# Patient Record
Sex: MALE | Race: WHITE | NOT HISPANIC OR LATINO | Employment: OTHER | URBAN - METROPOLITAN AREA
[De-identification: names, ages, dates, MRNs, and addresses within clinical notes are randomized per-mention and may not be internally consistent; named-entity substitution may affect disease eponyms.]

---

## 2017-03-03 ENCOUNTER — OFFICE VISIT - RIVER FALLS (OUTPATIENT)
Dept: FAMILY MEDICINE | Facility: CLINIC | Age: 67
End: 2017-03-03

## 2017-03-03 ASSESSMENT — MIFFLIN-ST. JEOR: SCORE: 1722.19

## 2017-05-30 ENCOUNTER — COMMUNICATION - RIVER FALLS (OUTPATIENT)
Dept: FAMILY MEDICINE | Facility: CLINIC | Age: 67
End: 2017-05-30

## 2017-05-30 ENCOUNTER — AMBULATORY - RIVER FALLS (OUTPATIENT)
Dept: FAMILY MEDICINE | Facility: CLINIC | Age: 67
End: 2017-05-30

## 2017-05-31 LAB
CHOLEST SERPL-MCNC: 114 MG/DL (ref 125–200)
CHOLEST/HDLC SERPL: 3.2 {RATIO}
CREAT SERPL-MCNC: 1.21 MG/DL (ref 0.7–1.25)
GLUCOSE BLD-MCNC: 152 MG/DL (ref 65–99)
HBA1C MFR BLD: 6.9 %
HDLC SERPL-MCNC: 36 MG/DL
LDLC SERPL CALC-MCNC: 56 MG/DL
NONHDLC SERPL-MCNC: 78 MG/DL
TRIGL SERPL-MCNC: 111 MG/DL

## 2017-06-07 ENCOUNTER — OFFICE VISIT - RIVER FALLS (OUTPATIENT)
Dept: FAMILY MEDICINE | Facility: CLINIC | Age: 67
End: 2017-06-07

## 2017-06-07 ASSESSMENT — MIFFLIN-ST. JEOR: SCORE: 1708.92

## 2017-09-06 ENCOUNTER — AMBULATORY - RIVER FALLS (OUTPATIENT)
Dept: FAMILY MEDICINE | Facility: CLINIC | Age: 67
End: 2017-09-06

## 2017-09-07 LAB — HBA1C MFR BLD: 6.3 %

## 2017-09-12 ENCOUNTER — OFFICE VISIT - RIVER FALLS (OUTPATIENT)
Dept: FAMILY MEDICINE | Facility: CLINIC | Age: 67
End: 2017-09-12

## 2017-09-12 ASSESSMENT — MIFFLIN-ST. JEOR: SCORE: 1717.99

## 2018-05-29 ENCOUNTER — AMBULATORY - RIVER FALLS (OUTPATIENT)
Dept: FAMILY MEDICINE | Facility: CLINIC | Age: 68
End: 2018-05-29

## 2018-05-30 LAB
CHOLEST SERPL-MCNC: 126 MG/DL
CHOLEST/HDLC SERPL: 3.3 {RATIO}
CREAT SERPL-MCNC: 1.23 MG/DL (ref 0.7–1.25)
GLUCOSE BLD-MCNC: 151 MG/DL (ref 65–99)
HBA1C MFR BLD: 7.1 %
HDLC SERPL-MCNC: 38 MG/DL
LDLC SERPL CALC-MCNC: 71 MG/DL
NONHDLC SERPL-MCNC: 88 MG/DL
TRIGL SERPL-MCNC: 88 MG/DL

## 2018-06-06 ENCOUNTER — TRANSFERRED RECORDS (OUTPATIENT)
Dept: MULTI SPECIALTY CLINIC | Facility: CLINIC | Age: 68
End: 2018-06-06

## 2018-06-06 ENCOUNTER — OFFICE VISIT - RIVER FALLS (OUTPATIENT)
Dept: FAMILY MEDICINE | Facility: CLINIC | Age: 68
End: 2018-06-06

## 2018-06-06 ASSESSMENT — MIFFLIN-ST. JEOR: SCORE: 1723.66

## 2018-12-19 ENCOUNTER — AMBULATORY - RIVER FALLS (OUTPATIENT)
Dept: FAMILY MEDICINE | Facility: CLINIC | Age: 68
End: 2018-12-19

## 2018-12-20 LAB — HBA1C MFR BLD: 8.1 %

## 2019-01-02 ENCOUNTER — OFFICE VISIT - RIVER FALLS (OUTPATIENT)
Dept: FAMILY MEDICINE | Facility: CLINIC | Age: 69
End: 2019-01-02

## 2019-01-02 ASSESSMENT — MIFFLIN-ST. JEOR: SCORE: 1734.55

## 2019-01-11 ENCOUNTER — OFFICE VISIT - RIVER FALLS (OUTPATIENT)
Dept: FAMILY MEDICINE | Facility: CLINIC | Age: 69
End: 2019-01-11

## 2019-01-11 ASSESSMENT — MIFFLIN-ST. JEOR: SCORE: 1737.27

## 2019-02-13 ENCOUNTER — OFFICE VISIT - RIVER FALLS (OUTPATIENT)
Dept: FAMILY MEDICINE | Facility: CLINIC | Age: 69
End: 2019-02-13

## 2019-02-13 ASSESSMENT — MIFFLIN-ST. JEOR: SCORE: 1717.31

## 2019-04-17 ENCOUNTER — AMBULATORY - RIVER FALLS (OUTPATIENT)
Dept: FAMILY MEDICINE | Facility: CLINIC | Age: 69
End: 2019-04-17

## 2019-04-18 LAB — HBA1C MFR BLD: 6.8 %

## 2019-05-23 ENCOUNTER — AMBULATORY - RIVER FALLS (OUTPATIENT)
Dept: FAMILY MEDICINE | Facility: CLINIC | Age: 69
End: 2019-05-23

## 2019-05-24 ENCOUNTER — COMMUNICATION - RIVER FALLS (OUTPATIENT)
Dept: FAMILY MEDICINE | Facility: CLINIC | Age: 69
End: 2019-05-24

## 2019-05-24 LAB
A/G RATIO - HISTORICAL: 2.2 (ref 1–2.5)
ALBUMIN SERPL-MCNC: 4.2 GM/DL (ref 3.6–5.1)
ALP SERPL-CCNC: 66 UNIT/L (ref 40–115)
ALT SERPL W P-5'-P-CCNC: 17 UNIT/L (ref 9–46)
AST SERPL W P-5'-P-CCNC: 15 UNIT/L (ref 10–35)
BILIRUB SERPL-MCNC: 0.9 MG/DL (ref 0.2–1.2)
BUN SERPL-MCNC: 24 MG/DL (ref 7–25)
BUN/CREAT RATIO - HISTORICAL: 16 (ref 6–22)
CALCIUM SERPL-MCNC: 9.3 MG/DL (ref 8.6–10.3)
CHLORIDE BLD-SCNC: 105 MMOL/L (ref 98–110)
CHOLEST SERPL-MCNC: 130 MG/DL
CHOLEST/HDLC SERPL: 3.3 {RATIO}
CO2 SERPL-SCNC: 28 MMOL/L (ref 20–32)
CREAT SERPL-MCNC: 1.47 MG/DL (ref 0.7–1.25)
EGFRCR SERPLBLD CKD-EPI 2021: 48 ML/MIN/1.73M2
ERYTHROCYTE [DISTWIDTH] IN BLOOD BY AUTOMATED COUNT: 12.8 % (ref 11–15)
GLOBULIN: 1.9 (ref 1.9–3.7)
GLUCOSE BLD-MCNC: 160 MG/DL (ref 65–99)
HCT VFR BLD AUTO: 45.4 % (ref 38.5–50)
HDLC SERPL-MCNC: 39 MG/DL
HGB BLD-MCNC: 15.9 GM/DL (ref 13.2–17.1)
LDLC SERPL CALC-MCNC: 72 MG/DL
MCH RBC QN AUTO: 31.3 PG (ref 27–33)
MCHC RBC AUTO-ENTMCNC: 35 GM/DL (ref 32–36)
MCV RBC AUTO: 89.4 FL (ref 80–100)
MICROALBUMIN UR-MCNC: 0.7 MG/DL
NONHDLC SERPL-MCNC: 91 MG/DL
PLATELET # BLD AUTO: 186 10*3/UL (ref 140–400)
PMV BLD: 10.4 FL (ref 7.5–12.5)
POTASSIUM BLD-SCNC: 5 MMOL/L (ref 3.5–5.3)
PROT SERPL-MCNC: 6.1 GM/DL (ref 6.1–8.1)
RBC # BLD AUTO: 5.08 10*6/UL (ref 4.2–5.8)
SODIUM SERPL-SCNC: 140 MMOL/L (ref 135–146)
TRIGL SERPL-MCNC: 111 MG/DL
WBC # BLD AUTO: 6.3 10*3/UL (ref 3.8–10.8)

## 2019-05-31 ENCOUNTER — OFFICE VISIT - RIVER FALLS (OUTPATIENT)
Dept: FAMILY MEDICINE | Facility: CLINIC | Age: 69
End: 2019-05-31

## 2019-05-31 ASSESSMENT — MIFFLIN-ST. JEOR: SCORE: 1691.91

## 2019-09-12 ENCOUNTER — AMBULATORY - RIVER FALLS (OUTPATIENT)
Dept: FAMILY MEDICINE | Facility: CLINIC | Age: 69
End: 2019-09-12

## 2019-09-13 ENCOUNTER — COMMUNICATION - RIVER FALLS (OUTPATIENT)
Dept: FAMILY MEDICINE | Facility: CLINIC | Age: 69
End: 2019-09-13

## 2019-09-13 LAB
BUN SERPL-MCNC: 20 MG/DL (ref 7–25)
BUN/CREAT RATIO - HISTORICAL: ABNORMAL (ref 6–22)
CALCIUM SERPL-MCNC: 9.2 MG/DL (ref 8.6–10.3)
CHLORIDE BLD-SCNC: 105 MMOL/L (ref 98–110)
CO2 SERPL-SCNC: 28 MMOL/L (ref 20–32)
CREAT SERPL-MCNC: 1.13 MG/DL (ref 0.7–1.18)
EGFRCR SERPLBLD CKD-EPI 2021: 65 ML/MIN/1.73M2
GLUCOSE BLD-MCNC: 149 MG/DL (ref 65–99)
HBA1C MFR BLD: 7.1 %
POTASSIUM BLD-SCNC: 4.9 MMOL/L (ref 3.5–5.3)
SODIUM SERPL-SCNC: 139 MMOL/L (ref 135–146)

## 2020-05-12 ENCOUNTER — AMBULATORY - RIVER FALLS (OUTPATIENT)
Dept: FAMILY MEDICINE | Facility: CLINIC | Age: 70
End: 2020-05-12

## 2020-05-13 LAB
A/G RATIO - HISTORICAL: 2.3 (ref 1–2.5)
ALBUMIN SERPL-MCNC: 4.4 GM/DL (ref 3.6–5.1)
ALP SERPL-CCNC: 78 UNIT/L (ref 35–144)
ALT SERPL W P-5'-P-CCNC: 16 UNIT/L (ref 9–46)
AST SERPL W P-5'-P-CCNC: 15 UNIT/L (ref 10–35)
BILIRUB SERPL-MCNC: 0.9 MG/DL (ref 0.2–1.2)
BUN SERPL-MCNC: 21 MG/DL (ref 7–25)
BUN/CREAT RATIO - HISTORICAL: ABNORMAL (ref 6–22)
CALCIUM SERPL-MCNC: 9.6 MG/DL (ref 8.6–10.3)
CHLORIDE BLD-SCNC: 101 MMOL/L (ref 98–110)
CHOLEST SERPL-MCNC: 128 MG/DL
CHOLEST/HDLC SERPL: 3.5 {RATIO}
CO2 SERPL-SCNC: 27 MMOL/L (ref 20–32)
CREAT SERPL-MCNC: 1.07 MG/DL (ref 0.7–1.18)
EGFRCR SERPLBLD CKD-EPI 2021: 70 ML/MIN/1.73M2
ERYTHROCYTE [DISTWIDTH] IN BLOOD BY AUTOMATED COUNT: 12.8 % (ref 11–15)
GLOBULIN: 1.9 (ref 1.9–3.7)
GLUCOSE BLD-MCNC: 337 MG/DL (ref 65–99)
HBA1C MFR BLD: 12.7 %
HCT VFR BLD AUTO: 44.2 % (ref 38.5–50)
HDLC SERPL-MCNC: 37 MG/DL
HGB BLD-MCNC: 16 GM/DL (ref 13.2–17.1)
LDLC SERPL CALC-MCNC: 67 MG/DL
MCH RBC QN AUTO: 32.1 PG (ref 27–33)
MCHC RBC AUTO-ENTMCNC: 36.2 GM/DL (ref 32–36)
MCV RBC AUTO: 88.6 FL (ref 80–100)
MICROALBUMIN UR-MCNC: 0.3 MG/DL
NONHDLC SERPL-MCNC: 91 MG/DL
PLATELET # BLD AUTO: 180 10*3/UL (ref 140–400)
PMV BLD: 11.1 FL (ref 7.5–12.5)
POTASSIUM BLD-SCNC: 4.8 MMOL/L (ref 3.5–5.3)
PROT SERPL-MCNC: 6.3 GM/DL (ref 6.1–8.1)
RBC # BLD AUTO: 4.99 10*6/UL (ref 4.2–5.8)
SODIUM SERPL-SCNC: 136 MMOL/L (ref 135–146)
TRIGL SERPL-MCNC: 165 MG/DL
WBC # BLD AUTO: 6.6 10*3/UL (ref 3.8–10.8)

## 2020-05-14 ENCOUNTER — COMMUNICATION - RIVER FALLS (OUTPATIENT)
Dept: FAMILY MEDICINE | Facility: CLINIC | Age: 70
End: 2020-05-14

## 2020-05-19 ENCOUNTER — OFFICE VISIT - RIVER FALLS (OUTPATIENT)
Dept: FAMILY MEDICINE | Facility: CLINIC | Age: 70
End: 2020-05-19

## 2020-08-25 ENCOUNTER — AMBULATORY - RIVER FALLS (OUTPATIENT)
Dept: FAMILY MEDICINE | Facility: CLINIC | Age: 70
End: 2020-08-25

## 2020-08-26 ENCOUNTER — COMMUNICATION - RIVER FALLS (OUTPATIENT)
Dept: FAMILY MEDICINE | Facility: CLINIC | Age: 70
End: 2020-08-26

## 2020-08-26 LAB
HBA1C MFR BLD: 9.7 %
PSA SERPL-MCNC: 1.1 NG/ML

## 2020-09-01 ENCOUNTER — OFFICE VISIT - RIVER FALLS (OUTPATIENT)
Dept: FAMILY MEDICINE | Facility: CLINIC | Age: 70
End: 2020-09-01

## 2020-09-01 ASSESSMENT — MIFFLIN-ST. JEOR: SCORE: 1626.59

## 2020-09-07 ENCOUNTER — COMMUNICATION - RIVER FALLS (OUTPATIENT)
Dept: FAMILY MEDICINE | Facility: CLINIC | Age: 70
End: 2020-09-07

## 2020-09-08 ENCOUNTER — OFFICE VISIT - RIVER FALLS (OUTPATIENT)
Dept: FAMILY MEDICINE | Facility: CLINIC | Age: 70
End: 2020-09-08

## 2020-12-16 ENCOUNTER — AMBULATORY - RIVER FALLS (OUTPATIENT)
Dept: FAMILY MEDICINE | Facility: CLINIC | Age: 70
End: 2020-12-16

## 2020-12-17 ENCOUNTER — COMMUNICATION - RIVER FALLS (OUTPATIENT)
Dept: FAMILY MEDICINE | Facility: CLINIC | Age: 70
End: 2020-12-17

## 2020-12-17 LAB — HBA1C MFR BLD: 7.7 %

## 2021-05-19 ENCOUNTER — AMBULATORY - RIVER FALLS (OUTPATIENT)
Dept: FAMILY MEDICINE | Facility: CLINIC | Age: 71
End: 2021-05-19

## 2021-05-20 ENCOUNTER — COMMUNICATION - RIVER FALLS (OUTPATIENT)
Dept: FAMILY MEDICINE | Facility: CLINIC | Age: 71
End: 2021-05-20

## 2021-05-20 LAB
A/G RATIO - HISTORICAL: 2.5 (ref 1–2.5)
ALBUMIN SERPL-MCNC: 4.3 GM/DL (ref 3.6–5.1)
ALP SERPL-CCNC: 68 UNIT/L (ref 35–144)
ALT SERPL W P-5'-P-CCNC: 16 UNIT/L (ref 9–46)
AST SERPL W P-5'-P-CCNC: 13 UNIT/L (ref 10–35)
BILIRUB SERPL-MCNC: 0.8 MG/DL (ref 0.2–1.2)
BUN SERPL-MCNC: 19 MG/DL (ref 7–25)
BUN/CREAT RATIO - HISTORICAL: ABNORMAL (ref 6–22)
CALCIUM SERPL-MCNC: 9.2 MG/DL (ref 8.6–10.3)
CHLORIDE BLD-SCNC: 104 MMOL/L (ref 98–110)
CHOLEST SERPL-MCNC: 127 MG/DL
CHOLEST/HDLC SERPL: 3.6 {RATIO}
CO2 SERPL-SCNC: 27 MMOL/L (ref 20–32)
CREAT SERPL-MCNC: 0.97 MG/DL (ref 0.7–1.18)
CREAT UR-MCNC: 116 MG/DL (ref 20–320)
EGFRCR SERPLBLD CKD-EPI 2021: 78 ML/MIN/1.73M2
ERYTHROCYTE [DISTWIDTH] IN BLOOD BY AUTOMATED COUNT: 12.8 % (ref 11–15)
GLOBULIN: 1.7 (ref 1.9–3.7)
GLUCOSE BLD-MCNC: 250 MG/DL (ref 65–99)
HBA1C MFR BLD: 10.3 %
HCT VFR BLD AUTO: 45.4 % (ref 38.5–50)
HDLC SERPL-MCNC: 35 MG/DL
HGB BLD-MCNC: 15.5 GM/DL (ref 13.2–17.1)
LDLC SERPL CALC-MCNC: 71 MG/DL
MCH RBC QN AUTO: 30.6 PG (ref 27–33)
MCHC RBC AUTO-ENTMCNC: 34.1 GM/DL (ref 32–36)
MCV RBC AUTO: 89.7 FL (ref 80–100)
MICROALBUMIN UR-MCNC: 0.8 MG/DL
MICROALBUMIN/CREAT UR: 7 MG/G{CREAT}
NONHDLC SERPL-MCNC: 92 MG/DL
PLATELET # BLD AUTO: 118 10*3/UL (ref 140–400)
PMV BLD: 10.8 FL (ref 7.5–12.5)
POTASSIUM BLD-SCNC: 4.6 MMOL/L (ref 3.5–5.3)
PROT SERPL-MCNC: 6 GM/DL (ref 6.1–8.1)
RBC # BLD AUTO: 5.06 10*6/UL (ref 4.2–5.8)
SODIUM SERPL-SCNC: 138 MMOL/L (ref 135–146)
TRIGL SERPL-MCNC: 127 MG/DL
WBC # BLD AUTO: 4.9 10*3/UL (ref 3.8–10.8)

## 2021-05-28 ENCOUNTER — OFFICE VISIT - RIVER FALLS (OUTPATIENT)
Dept: FAMILY MEDICINE | Facility: CLINIC | Age: 71
End: 2021-05-28

## 2021-09-10 ENCOUNTER — AMBULATORY - RIVER FALLS (OUTPATIENT)
Dept: FAMILY MEDICINE | Facility: CLINIC | Age: 71
End: 2021-09-10

## 2021-09-11 ENCOUNTER — COMMUNICATION - RIVER FALLS (OUTPATIENT)
Dept: FAMILY MEDICINE | Facility: CLINIC | Age: 71
End: 2021-09-11

## 2021-09-11 LAB — HBA1C MFR BLD: 7 %

## 2021-12-09 ENCOUNTER — OFFICE VISIT - RIVER FALLS (OUTPATIENT)
Dept: FAMILY MEDICINE | Facility: CLINIC | Age: 71
End: 2021-12-09

## 2022-02-11 VITALS
HEART RATE: 72 BPM | SYSTOLIC BLOOD PRESSURE: 128 MMHG | HEART RATE: 64 BPM | WEIGHT: 200 LBS | TEMPERATURE: 97.7 F | BODY MASS INDEX: 30.01 KG/M2 | HEIGHT: 68 IN | DIASTOLIC BLOOD PRESSURE: 76 MMHG | SYSTOLIC BLOOD PRESSURE: 123 MMHG | DIASTOLIC BLOOD PRESSURE: 74 MMHG | WEIGHT: 198 LBS | SYSTOLIC BLOOD PRESSURE: 128 MMHG | DIASTOLIC BLOOD PRESSURE: 72 MMHG | BODY MASS INDEX: 30.19 KG/M2 | HEART RATE: 78 BPM

## 2022-02-11 VITALS
WEIGHT: 216.4 LBS | HEART RATE: 68 BPM | DIASTOLIC BLOOD PRESSURE: 68 MMHG | HEIGHT: 69 IN | SYSTOLIC BLOOD PRESSURE: 126 MMHG | TEMPERATURE: 97.6 F | BODY MASS INDEX: 32.05 KG/M2

## 2022-02-11 VITALS
WEIGHT: 222.4 LBS | HEART RATE: 72 BPM | DIASTOLIC BLOOD PRESSURE: 70 MMHG | BODY MASS INDEX: 33.04 KG/M2 | DIASTOLIC BLOOD PRESSURE: 72 MMHG | BODY MASS INDEX: 33.71 KG/M2 | SYSTOLIC BLOOD PRESSURE: 130 MMHG | WEIGHT: 218 LBS | SYSTOLIC BLOOD PRESSURE: 126 MMHG | BODY MASS INDEX: 33.62 KG/M2 | HEART RATE: 64 BPM | WEIGHT: 221.8 LBS | SYSTOLIC BLOOD PRESSURE: 130 MMHG | TEMPERATURE: 97.5 F | HEIGHT: 68 IN | HEART RATE: 64 BPM | HEIGHT: 68 IN | HEIGHT: 68 IN | DIASTOLIC BLOOD PRESSURE: 70 MMHG

## 2022-02-11 VITALS
SYSTOLIC BLOOD PRESSURE: 132 MMHG | DIASTOLIC BLOOD PRESSURE: 74 MMHG | HEIGHT: 69 IN | TEMPERATURE: 97.6 F | OXYGEN SATURATION: 96 % | WEIGHT: 218.2 LBS | HEART RATE: 70 BPM | BODY MASS INDEX: 32.32 KG/M2

## 2022-02-11 VITALS
BODY MASS INDEX: 31.76 KG/M2 | HEIGHT: 69 IN | HEART RATE: 76 BPM | TEMPERATURE: 97.4 F | DIASTOLIC BLOOD PRESSURE: 68 MMHG | SYSTOLIC BLOOD PRESSURE: 122 MMHG | WEIGHT: 214.4 LBS

## 2022-02-11 VITALS
RESPIRATION RATE: 16 BRPM | WEIGHT: 204 LBS | SYSTOLIC BLOOD PRESSURE: 118 MMHG | HEART RATE: 60 BPM | DIASTOLIC BLOOD PRESSURE: 70 MMHG | OXYGEN SATURATION: 94 % | BODY MASS INDEX: 30.79 KG/M2

## 2022-02-11 VITALS
DIASTOLIC BLOOD PRESSURE: 62 MMHG | TEMPERATURE: 97.8 F | WEIGHT: 219.4 LBS | BODY MASS INDEX: 33.25 KG/M2 | HEART RATE: 76 BPM | SYSTOLIC BLOOD PRESSURE: 124 MMHG | HEIGHT: 68 IN

## 2022-02-11 VITALS
SYSTOLIC BLOOD PRESSURE: 122 MMHG | HEIGHT: 68 IN | TEMPERATURE: 97.7 F | HEART RATE: 64 BPM | BODY MASS INDEX: 32.19 KG/M2 | DIASTOLIC BLOOD PRESSURE: 74 MMHG | WEIGHT: 212.4 LBS

## 2022-02-11 VITALS
WEIGHT: 201.4 LBS | HEART RATE: 68 BPM | OXYGEN SATURATION: 98 % | SYSTOLIC BLOOD PRESSURE: 125 MMHG | DIASTOLIC BLOOD PRESSURE: 79 MMHG | BODY MASS INDEX: 30.4 KG/M2

## 2022-02-11 VITALS
WEIGHT: 204 LBS | BODY MASS INDEX: 30.79 KG/M2 | HEART RATE: 64 BPM | SYSTOLIC BLOOD PRESSURE: 136 MMHG | DIASTOLIC BLOOD PRESSURE: 76 MMHG

## 2022-02-16 NOTE — PROGRESS NOTES
Patient:   SUDHIR PINEDO            MRN: [id number]            FIN: [id number]               Age:   67 years     Sex:  Male     :  1950   Associated Diagnoses:   Atypical mole   Author:   Sree Arana MD      Visit Information      Date of Service: 2018 09:00 am  Performing Location: [facility]  Encounter#: [id number]      Primary Care Provider (PCP):  Sree Arana MD    NPI# [id number]      Referring Provider:  Sree Arana MD# [id number]      Procedure   Biopsy procedure   Date/ Time:  2018 10:05:00 AM.     Confirmed: patient, procedure, side, site, safety procedures followed.     Performed by: self.     Informed consent: signed by patient.     Indication: lesion, change in appearance.     Preparation and technique: skin prepped (in usual sterile fashion, with alcohol), local anesthesia 1% lidocaine without epinephrine, technique shave biopsy, hemostasis achieved using electrocautery.     Site #  1: left, size and depth (length  .75  cm, width  .75  cm, superficial), specimen sent to pathology, forehead.     Procedure tolerated: well.     No Complications.        Impression and Plan   Diagnosis     Atypical mole (FHV37-DC L81.4).

## 2022-02-16 NOTE — LETTER
(Inserted Image. Unable to display)     [address] [place] [address]  [phone] (phone)  [phone] (fax)  SUDHIR PINEDO    [address]   [place], [place] [id number]        Dear SUDHIR,    Thank you for selecting our practice as your care provider. Below you will find the results and recent diagnostic testings outcomes we have reviewed.        These results look better, keep up the good work!  Please keep scheduled follow up appointments.        Result Name Current Result Previous Result Reference Range   Hgb A1c ((H)) 7.7 12/16/2020 ((H)) 9.7 8/25/2020  ((H)) 12.7 5/12/2020  ((H)) 7.1 9/12/2019  - <5.7       Please contact my practice at the number listed above if you have any questions or concerns.     Sincerely,        Sree Arana MD, FAAFP

## 2022-02-16 NOTE — LETTER
(Inserted Image. Unable to display)   December 14, 2021  [place] [address]        Dear SUDHIR,    Thank you for selecting [facility] for your healthcare needs.    Our records indicate you are due for the following services:     Diabetic Exam ~ Please bring your glucose meter and/or your blood glucose diary to your appointment.    (FYI   Regarding office visits: In some instances, a video visit or telephone visit may be offered as an option.)    To schedule an appointment or if you have further questions, please contact your clinic at [phone].    Powered by [employer]    Sincerely,    Sree Arana MD

## 2022-02-16 NOTE — PROGRESS NOTES
Patient:   SUDHIR PINEDO            MRN: [id number]            FIN: [id number]               Age:   70 years     Sex:  Male     :  1950   Associated Diagnoses:   Type 2 diabetes mellitus   Author:   Sree Arana MD      Visit Information      Date of Service: 2020 09:23 am  Performing Location: [facility]  Encounter#: [id number]      Primary Care Provider (PCP):  Sree Arana MD    NPI# [id number]      Referring Provider:  Magdy Mims MD    NPI# [id number]      Chief Complaint   2020 9:26 AM CDT     DM med check, Review labs, Look at mole        History of Present Illness             The patient presents for follow-up evaluation of diabetes.  The quality of the patient's diabetes is described as being unchanged from previous visit.  Exacerbating factors consist of none.  Relieving factors consist of medication.  Glucose results: elevated.  Hemoglobin A1c results: > 6%, elevated and improving.        Review of Systems   Constitutional:  Negative.    Respiratory:  Negative.    Cardiovascular:  Negative.       Health Status   Allergies:    Allergic Reactions (Selected)  No Known Medication Allergies   Medications:  (Selected)   Prescriptions  Prescribed  Cialis 20 mg oral tablet: = 1 tab(s) ( 20 mg ), po, daily, # 30 tab(s), 3 Refill(s), Type: Maintenance, Pharmacy: [facility], 1 tab(s) Oral daily  atorvastatin 40 mg oral tablet: = 1 tab(s) ( 40 mg ), PO, Daily, # 90 tab(s), 3 Refill(s), Type: Maintenance, Pharmacy: [facility], 1 tab(s) Oral daily  indomethacin 50 mg oral capsule: = 1 cap(s) ( 50 mg ), Oral, tid, PRN: for gout pain, # 30 cap(s), 0 Refill(s), Type: Maintenance, Pharmacy: [facility], 1 cap(s) Oral tid,PRN:for gout pain  lisinopril 20 mg oral tablet: = 1 tab(s) ( 20 mg ), po, daily, # 90 tab(s), 3 Refill(s), Type: Maintenance, Pharmacy: [facility], 1 tab(s) Oral daily  metFORMIN 500 mg oral tablet: = 2 tab(s)  ( 1,000 mg ), PO, BID, # 360 tab(s), 3 Refill(s), Type: Maintenance, Pharmacy: [facility], 2 tab(s) Oral bid,x90 day(s), 68.25, in, 20 9:26:00 CDT, Height Measured, 198, lb, 20 9:26:00 CDT, Weight Measured  Documented Medications  Documented  aspirin 81 mg oral tablet: 1 tab(s) ( 81 mg ), po, daily, 0 Refill(s), Type: Maintenance   Problem list:    All Problems (Selected)  Hypertension / SNOMED CT 0235181282 / Confirmed  Prostatitis, unspecified / SNOMED CT 85994962 / Confirmed  Type 2 diabetes mellitus / SNOMED CT 107549228 / Confirmed  Type 2 diabetes mellitus, controlled, with renal complications / SNOMED CT 557818927 / Confirmed  Hyperlipidemia, combined / ICD-9-.4 / Confirmed  Obese / ICD-9-.00 / Probable  Hemorrhoids / ICD-9-.6 / Confirmed  Erectile Dysfunction / ICD-9-.84 / Confirmed      Histories   Past Medical History:    Active  Hemorrhoids (ICD-9-.6)  Prostatitis, unspecified (SNOMED CT 71827487)  Hypertension (SNOMED CT 4201631571)  Erectile Dysfunction (ICD-9-.84)  Obese (ICD-9-.00)  Hyperlipidemia, combined (ICD-9-.4)  Type 2 diabetes mellitus (SNOMED CT 469391666)   Family History:    Hypertension  Father ()  Mother ()  Heart disease  Father ()     Procedure history:    Extracapsular cataract extraction and insertion of intraocular lens (SNOMED CT 799729443) performed by Karan Odom MD on 2015 at 64 Years.  Comments:  3/5/2015 9:30 AM CST - Melissa Hamlin  Left.  Colonoscopy (SNOMED CT 406852092) performed by Salvador Clark MD on 2013 at 63 Years.  Comments:  2013 8:55 AM CDT - Virgilio Soria MA  Normal colonoscopy repeat in 10 years  Laparoscopic appendectomy (SNOMED CT 04095248) in the month of 2012 at 62 Years.  Colonoscopy (SNOMED CT 822262775) performed by Tl Rodriguez MD on 2003 at 53 Years.  Comments:  7/10/2012 3:07 PM CDT - Rachell Pozo CMA  Every 10 years  Flexible  sigmoidoscopy (SNOMED CT 52549576) performed by Sree Arana MD on 2/25/2000 at 49 Years.  Cataract surgery (SNOMED CT 414431279).  Comments:  6/9/2015 1:02 PM CDT - Iman Ozuna  left eye  Retinal detachment (SNOMED CT 07842859).  Comments:  6/9/2015 1:04 PM CDT - Iman Ozuna  Left eye    6/9/2015 1:04 PM CDT - Iman Ozuna  Repaired with laser  Crown of tooth (SNOMED CT 918163216).  Comments:  6/6/2018 9:19 AM CDT - Saritha Alberto CMA  04/2018   Social History:        Alcohol Assessment: Current            Beer (12 oz), 1-2 times per week, 1 drinks/episode average.      Tobacco Assessment: Past            Past, Cigarettes, 10 per day.  Started age 18 Years.  Stopped age 42 Years.            Quit in 1991      Substance Abuse Assessment: Denies Substance Abuse            Never      Employment and Education Assessment            Retired            Work/School description: [employer].      Home and Environment Assessment            Marital status: .  Spouse/Partner name: Angela.  Lives with Spouse.  1 children.  Living situation:               Home/Independent.  Smoker in household: No.      Nutrition and Health Assessment            Diet: Low Sugar.  Type of diet: Regular.  Caffeine intake amount: Diet Coke, Coffee 1 cup a day.      Exercise and Physical Activity Assessment: Regular exercise            Exercise duration: 60.  Exercise frequency: 3-4 times/week.  Exercise type: Weight lifting.      Sexual Assessment            Sexually active: Yes.  Sexual orientation: Heterosexual.      Other Assessment             w/1 child and 4 step-children        Physical Examination   Vital Signs   9/1/2020 9:26 AM CDT Peripheral Pulse Rate 72 bpm    Pulse Site Radial artery    HR Method Manual    Systolic Blood Pressure 128 mmHg    Diastolic Blood Pressure 74 mmHg    Mean Arterial Pressure 92 mmHg    BP Site Right arm    BP Method Manual      Measurements from flowsheet : Measurements    9/1/2020 9:26 AM CDT Height Measured - Standard 68.25 in    Weight Measured - Standard 198 lb    BSA 2.08 m2    Body Mass Index 29.88 kg/m2  HI      General:  Alert and oriented, No acute distress.    Eye:  Pupils are equal, round and reactive to light, Extraocular movements are intact, Normal conjunctiva.    HENT:  Normocephalic, Tympanic membranes are clear, Normal hearing.    Neck:  Supple, Non-tender, No carotid bruit.    Respiratory:  Lungs are clear to auscultation, Respirations are non-labored, Breath sounds are equal.    Cardiovascular:  Normal rate, Regular rhythm, No murmur.    Gastrointestinal:  Soft, Non-tender, Non-distended.       Review / Management   Results review:  Lab results   8/25/2020 11:06 AM CDT Hgb A1c 9.7  HI    PSA 1.1 ng/mL   .       Impression and Plan   Diagnosis     Type 2 diabetes mellitus (QYB19-IW E11.9).     Course:  Improving.    Orders     Orders (Selected)   Prescriptions  Prescribed  metFORMIN 500 mg oral tablet: = 2 tab(s) ( 1,000 mg ), PO, BID, # 360 tab(s), 3 Refill(s), Type: Maintenance, Pharmacy: [facility], 2 tab(s) Oral bid,x90 day(s), 68.25, in, 09/01/20 9:26:00 CDT, Height Measured, 198, lb, 09/01/20 9:26:00 CDT, Weight Measured.     Note increase in dose..     Orders     Orders (Selected)   Outpatient Orders  Ordered  Return to Clinic (Request): RFV: A1C 1 week before, Return in 3 months.

## 2022-02-16 NOTE — PROGRESS NOTES
Patient:   USDHIR PINEDO            MRN: [id number]            FIN: [id number]               Age:   71 years     Sex:  Male     :  1950   Associated Diagnoses:   Seborrheic keratosis   Author:   Sree Arana MD      Procedure   Dermatology Surgical Procedure   Date/ Time:  2021 3:36:00 PM.     Confirmed: patient, procedure, side, and site are correct, safety procedures followed.     Informed consent: Verbally obtained.     Performed by: Sree Arana MD.     Indication: remove lesion.     Procedure performed: cryosurgery.     Cryosurgery: site: scalp, liquid nitrogen applied.     Complications: none.     Procedure tolerated: well.        Impression and Plan   Diagnosis     Seborrheic keratosis (NKD00-RY L82.1).

## 2022-02-16 NOTE — PROGRESS NOTES
Patient:   SUDHIR PINEDO            MRN: [id number]            FIN: [id number]               Age:   68 years     Sex:  Male     :  1950   Associated Diagnoses:   Type 2 diabetes mellitus; SK (seborrheic keratosis)   Author:   Sree Arana MD      Visit Information      Date of Service: 2019 08:05 am  Performing Location: [facility]  Encounter#: [id number]      Primary Care Provider (PCP):  Sree Arana MD    NPI# [id number]      Referring Provider:  Sree Arana MD, NPI# [id number]      Chief Complaint   2019 8:06 AM CST     DM med check: Follow up DM labs     Chief complaint and symptoms noted above confirmed with patient.      History of Present Illness             The patient presents for follow-up evaluation of diabetes.  The quality of the patient's diabetes is described as increased hyperglycemic episodes.  Exacerbating factors consist of noncompliance with diet.  Relieving factors consist of medication.  Glucose results: elevated.  Hemoglobin A1c results: > 6% and elevated.        Review of Systems   Constitutional:  Negative.    Respiratory:  Negative.    Cardiovascular:  Negative.    Endocrine:  Negative except as documented in history of present illness.       Health Status   Allergies:    Allergic Reactions (Selected)  No Known Medication Allergies   Medications:  (Selected)   Prescriptions  Prescribed  Cialis 20 mg oral tablet: 1 tab(s) ( 20 mg ), po, daily, # 90 tab(s), 3 Refill(s), Type: Maintenance, Pharmacy: [facility], 1 tab(s) po daily  Indocin 50 mg oral capsule: 1 cap(s) ( 50 mg ), PO, TID, PRN: for gout pain, # 30 cap(s), 0 Refill(s), Type: Maintenance, Pharmacy: [id number], 1 cap(s) po tid,PRN:for gout pain  atorvastatin 40 mg oral tablet: 1 tab(s) ( 40 mg ), PO, Daily, # 90 tab(s), 3 Refill(s), Type: Maintenance, Pharmacy: [facility], Patient coming today to fill as he is out., 1 tab(s) po  daily  lisinopril 20 mg oral tablet: 1 tab(s) ( 20 mg ), po, daily, # 90 tab(s), 3 Refill(s), Type: Maintenance, Pharmacy: [facility], 1 tab(s) po daily  metFORMIN 500 mg oral tablet: 1 tab(s) ( 500 mg ), PO, BID, # 180 tab(s), 3 Refill(s), Type: Maintenance, Pharmacy: [facility], 1 tab(s) po bid  Documented Medications  Documented  aspirin 81 mg oral tablet: 1 tab(s) ( 81 mg ), po, daily, 0 Refill(s), Type: Maintenance   Problem list:    All Problems (Selected)  Erectile Dysfunction / ICD-9-.84 / Confirmed  Hemorrhoids / ICD-9-.6 / Confirmed  Hyperlipidemia, combined / ICD-9-.4 / Confirmed  Hypertension / SNOMED CT 6024203825 / Confirmed  Obese / ICD-9-.00 / Probable  Prostatitis, unspecified / SNOMED CT 09811693 / Confirmed  Type 2 diabetes mellitus / SNOMED CT 597593783 / Confirmed      Histories   Past Medical History:    Active  Hemorrhoids (ICD-9-.6)  Prostatitis, unspecified (SNOMED CT 30699832)  Hypertension (SNOMED CT 7317927157)  Erectile Dysfunction (ICD-9-.84)  Obese (ICD-9-.00)  Hyperlipidemia, combined (ICD-9-.4)  Type 2 diabetes mellitus (SNOMED CT 608433859)   Family History:    Hypertension  Father ()  Mother ()  Heart disease  Father ()     Procedure history:    Extracapsular cataract extraction and insertion of intraocular lens (SNOMED CT 541191054) performed by Karan Odom MD on 2015 at 64 Years.  Comments:  3/5/2015 9:30 AM CST - Melissa Hamlin  Left.  Colonoscopy (SNOMED CT 328570675) performed by Salvador Clark MD on 2013 at 63 Years.  Comments:  2013 8:55 AM CDT - Virgilio Soria MA  Normal colonoscopy repeat in 10 years  Laparoscopic appendectomy (SNOMED CT 42251696) in the month of 2012 at 62 Years.  Colonoscopy (SNOMED CT 210283876) performed by Tl Rodriguez MD on 2003 at 53 Years.  Comments:  7/10/2012 3:07 PM CDT - Rachell Pozo CMA  Every 10 years  Flexible sigmoidoscopy  (SNOMED CT 61889163) performed by Sree Arana MD on 2/25/2000 at 49 Years.  Cataract surgery (SNOMED CT 396729435).  Comments:  6/9/2015 1:02 PM CDT - Iman Ozuna  left eye  Retinal detachment (SNOMED CT 80984380).  Comments:  6/9/2015 1:04 PM CDT - Iman Ozuna  Left eye    6/9/2015 1:04 PM CDT - Iman Ozuna  Repaired with laser  Crown of tooth (SNOMED CT 662008342).  Comments:  6/6/2018 9:19 AM CDT - Vetosierra CORRIE Saritha  04/2018   Social History:        Alcohol Assessment: Current            Beer (12 oz), 1-2 times per week, 1 drinks/episode average.      Tobacco Assessment: Past            Past, Cigarettes, 10 per day.  Started age 18 Years.  Stopped age 42 Years.            Quit in 1991      Substance Abuse Assessment: Denies Substance Abuse            Never      Employment and Education Assessment            Retired            Work/School description: [employer].      Home and Environment Assessment            Marital status: .  Spouse/Partner name: Angela.  Lives with Spouse.  1 children.  Living situation:               Home/Independent.  Smoker in household: No.      Nutrition and Health Assessment            Diet: Low Sugar.  Type of diet: Regular.  Caffeine intake amount: Diet Coke, Coffee 1 cup a day.      Exercise and Physical Activity Assessment: Regular exercise            Exercise duration: 60.  Exercise frequency: 3-4 times/week.  Exercise type: Weight lifting.      Sexual Assessment            Sexually active: Yes.  Sexual orientation: Heterosexual.      Other Assessment             w/1 child and 4 step-children      Physical Examination   Vital Signs   1/2/2019 8:06 AM CST Peripheral Pulse Rate 72 bpm    Pulse Site Radial artery    HR Method Manual    Systolic Blood Pressure 130 mmHg    Diastolic Blood Pressure 70 mmHg    Mean Arterial Pressure 90 mmHg    BP Site Right arm    BP Method Manual      Measurements from flowsheet : Measurements   1/2/2019 8:06 AM  CST Height Measured - Standard 68.25 in    Weight Measured - Standard 221.8 lb    BSA 2.2 m2    Body Mass Index 33.47 kg/m2  HI      General:  Alert and oriented, No acute distress.    HENT:  2 moles, will come back for excision..    Respiratory:  Lungs are clear to auscultation.    Cardiovascular:  Normal rate.    Gastrointestinal:  Soft.    Feet:  Normal by visual exam, By monofilament exam.       Review / Management   Results review:  Lab results   12/19/2018 3:03 PM CST U Microalbumin 2.1 mg/dL    Microalbumin Comment See comment   12/19/2018 7:59 AM CST Hgb A1c 8.1  HI   .       Impression and Plan   Diagnosis     Type 2 diabetes mellitus (AHK97-NW E11.9).     Course:  Worsening.    Patient Instructions:       Counseled: Patient, Diet, Activity, Verbalized understanding.    Orders     Orders   Requests (Return to Office):  Return to Clinic (Request) (Order): RFV: A1C 1 and appointment if >8, Return in 3 months.     Diagnosis     SK (seborrheic keratosis) (QHI80-FE L82.1).     Course:  Progressing as expected.    Orders     Will come back for Excision to remove..

## 2022-02-16 NOTE — LETTER
(Inserted Image. Unable to display)     [address] [place] [address] [phone] (phone)  [phone] (fax)  SUDHIR PINEDO    [address]   [place], [place] [id number]        Dear SUDHIR,    Thank you for selecting our practice as your care provider. Below you will find the results and recent diagnostic testings outcomes we have reviewed.        Please make an appointment to discuss these results.      Result Name Current Result Previous Result Reference Range   Sodium Level (mmol/L)  140 5/23/2019  141 5/29/2018   140 5/30/2017 135 - 146   Potassium Level (mmol/L)  5.0 5/23/2019  4.8 5/29/2018   4.6 5/30/2017 3.5 - 5.3   Chloride Level (mmol/L)  105 5/23/2019  107 5/29/2018   106 5/30/2017 98 - 110   CO2 Level (mmol/L)  28 5/23/2019  26 5/29/2018   27 5/30/2017 20 - 32   Glucose Level (mg/dL) ((H)) 160 5/23/2019 ((H)) 151 5/29/2018  ((H)) 152 5/30/2017 65 - 99   BUN (mg/dL)  24 5/23/2019  23 5/29/2018   21 5/30/2017 7 - 25   Creatinine Level (mg/dL) ((H)) 1.47 5/23/2019  1.23 5/29/2018   1.21 5/30/2017 0.70 - 1.25   BUN/Creat Ratio  16 5/23/2019  NOT APPLICABLE 5/29/2018   NOT APPLICABLE 5/30/2017 6 - 22   eGFR (mL/min/1.73m2) ((L)) 48 5/23/2019  60 5/29/2018   62 5/30/2017 > OR = 60 -    Calcium Level (mg/dL)  9.3 5/23/2019  9.5 5/29/2018   9.2 5/30/2017 8.6 - 10.3   Bilirubin Total (mg/dL)  0.9 5/23/2019  1.1 5/29/2018   1.1 5/30/2017 0.2 - 1.2   Alkaline Phosphatase (unit/L)  66 5/23/2019  76 5/29/2018   83 5/30/2017 40 - 115   AST/SGOT (unit/L)  15 5/23/2019  15 5/29/2018   18 5/30/2017 10 - 35   ALT/SGPT (unit/L)  17 5/23/2019  16 5/29/2018   16 5/30/2017 9 - 46   Protein Total (gm/dL)  6.1 5/23/2019  6.5 5/29/2018   6.6 5/30/2017 6.1 - 8.1   Albumin Level (gm/dL)  4.2 5/23/2019  4.2 5/29/2018   4.2 5/30/2017 3.6 - 5.1   Globulin  1.9 5/23/2019  2.3 5/29/2018   2.4 5/30/2017 1.9 - 3.7   A/G Ratio  2.2 5/23/2019  1.8 5/29/2018   1.8 5/30/2017 1.0 - 2.5   Cholesterol (mg/dL)  130 5/23/2019  126  5/29/2018  ((L)) 114 5/30/2017  - <200   Non-HDL Cholesterol  91 5/23/2019  88 5/29/2018   78 5/30/2017  - <130   HDL (mg/dL) ((L)) 39 5/23/2019 ((L)) 38 5/29/2018  ((L)) 36 5/30/2017 >40 -    Cholesterol/HDL Ratio  3.3 5/23/2019  3.3 5/29/2018   3.2 5/30/2017  - <5.0   LDL  72 5/23/2019  71 5/29/2018   56 5/30/2017    Triglyceride (mg/dL)  111 5/23/2019  88 5/29/2018   111 5/30/2017  - <150   U Microalbumin (mg/dL)  0.7 5/23/2019  2.1 12/19/2018   0.6 9/6/2017 See Note: -    Microalbumin Comment  See comment 5/23/2019  See comment 12/19/2018   See comment 9/6/2017    WBC  6.3 5/23/2019  6.5 5/29/2018   6.9 5/30/2017 3.8 - 10.8   RBC  5.08 5/23/2019  5.05 5/29/2018   5.25 5/30/2017 4.20 - 5.80   Hgb (gm/dL)  15.9 5/23/2019  15.5 5/29/2018   16.0 5/30/2017 13.2 - 17.1   Hct (%)  45.4 5/23/2019  45.1 5/29/2018   46.2 5/30/2017 38.5 - 50.0   MCV (fL)  89.4 5/23/2019  89.3 5/29/2018   88.0 5/30/2017 80.0 - 100.0   MCH (pg)  31.3 5/23/2019  30.7 5/29/2018   30.5 5/30/2017 27.0 - 33.0   MCHC (gm/dL)  35.0 5/23/2019  34.4 5/29/2018   34.6 5/30/2017 32.0 - 36.0   RDW (%)  12.8 5/23/2019  13.2 5/29/2018   13.0 5/30/2017 11.0 - 15.0   Platelet  186 5/23/2019  179 5/29/2018   184 5/30/2017 140 - 400   MPV (fL)  10.4 5/23/2019  10.0 5/29/2018   10.4 5/30/2017 7.5 - 12.5       Please contact my practice at the number listed above if you have any questions or concerns.     Sincerely,        Sree Arana MD, FAAFP

## 2022-02-16 NOTE — PROGRESS NOTES
Patient:   SUDHIR PINEDO            MRN: [id number]            FIN: [id number]               Age:   66 years     Sex:  Male     :  1950   Associated Diagnoses:   Type 2 diabetes mellitus; Hypertension; Hyperlipidemia, combined; Seasonal allergies; Erectile Dysfunction   Author:   Sree Arana MD      Report Summary   DiagnosisCourse:  Worsening. Orders  Orders   Requests (Return to Office):  Return to Clinic (Request) (Order): RFV: A1C 1 week prior, Return in 3 months.  Orders (Selected)   Outpatient Orders  Ordered  Dietary Consult (Request): Referred to: Catherine Peraza, Reason: DM follow up, Type 2 diabetes mellitus  Prescriptions  Prescribed  metFORMIN 500 mg oral tablet: 1 tab(s) ( 500 mg ), PO, BID, # 60 tab(s), 2 Refill(s), Type: Maintenance, Pharmacy: [id number], 1 tab(s) po bid,x30 day(s).     Visit Information      Date of Service: 2017 08:49 am  Performing Location: [facility]  Encounter#: [id number]      Primary Care Provider (PCP):  Sree Arana MD# [id number]      Referring Provider:  Sree Arana MD# [id number]   Visit type:  Scheduled follow-up.    Source of history:  Self.    History limitation:  None.       Chief Complaint   2017 9:19 AM CDT     HTN med check, review labs, check ears; c/o sinus congestion x 2 weeks     Chief complaint and symptoms noted above confirmed with patient.      History of Present Illness             The patient presents for follow-up evaluation of diabetes.  The quality of the patient's diabetes is described as being unchanged from previous visit.  Associated symptoms consist of none.  Compliance problems: none.  Glucose results: within target range.  Hemoglobin A1c results: > 6%, within target range and rising.               The patient presents for follow-up evaluation of hypertension.  The quality of hypertension symptom(s) since the patient's last visit is described as being unchanged.  The  severity of the hypertension symptom(s) since the last visit is moderate.  Since the patient's last visit, the timing/course of hypertension symptom(s) is constant.  Exacerbating factors consist of none.  Relieving factors consist of medication.  Complaint: Seasonal Allergies , has lasted for 2 week(s), is moderate and is episodic.        Review of Systems   Constitutional:  Negative.    Respiratory:  Negative.    Cardiovascular:  Negative.    Gastrointestinal:  Negative.             Health Status   Allergies:    Allergic Reactions (Selected)  No Known Medication Allergies   Medications:  (Selected)   Prescriptions  Prescribed  Cialis 20 mg oral tablet: 1 tab(s) ( 20 mg ), po, daily, # 8 tab(s), 3 Refill(s), Type: Maintenance, Pharmacy: [facility], 1 tab(s) po daily  Indocin 50 mg oral capsule: 1 cap(s) ( 50 mg ), PO, TID, PRN: for gout pain, # 30 cap(s), 0 Refill(s), Type: Maintenance, Pharmacy: [id number], 1 cap(s) po tid,PRN:for gout pain  atorvastatin 40 mg oral tablet: 1 tab(s) ( 40 mg ), PO, Daily, # 90 tab(s), 3 Refill(s), Type: Maintenance, Pharmacy: [facility], 1 tab(s) po daily  lisinopril 20 mg oral tablet: 1 tab(s) ( 20 mg ), po, daily, # 90 tab(s), 3 Refill(s), Type: Maintenance, Pharmacy: [facility], 1 tab(s) po daily  metFORMIN 500 mg oral tablet: 1 tab(s) ( 500 mg ), PO, BID, # 60 tab(s), 2 Refill(s), Type: Maintenance, Pharmacy: [facility], 1 tab(s) po bid,x30 day(s)  Documented Medications  Documented  aspirin 81 mg oral tablet: 1 tab(s) ( 81 mg ), po, daily, 0 Refill(s), Type: Maintenance   Problem list:    All Problems  Erectile Dysfunction / ICD-9-.84 / Confirmed  Hemorrhoids / ICD-9-.6 / Confirmed  Hyperlipidemia, combined / ICD-9-.4 / Confirmed  Hypertension / SNOMED CT 6153739892 / Confirmed  Glucose intolerance (impaired glucose tolerance) / SNOMED CT 33649526 / Confirmed  Obese / ICD-9-.00 /  Probable  Prostatitis, unspecified / SNOMED CT 21367023 / Confirmed  Type 2 diabetes mellitus / SNOMED CT 279327032 / Confirmed  Inactive: Tobacco abuse / ICD-9-.1      Histories   Past Medical History:    Active  Hemorrhoids (ICD-9-.6)  Prostatitis, unspecified (SNOMED CT 38085529)  Hypertension (SNOMED CT 2832047386)  Erectile Dysfunction (ICD-9-.84)  Glucose intolerance (impaired glucose tolerance) (SNOMED CT 78585241)  Obese (ICD-9-.00)  Hyperlipidemia, combined (ICD-9-.4)   Family History:    Hypertension  Father ()  Mother ()  Heart disease  Father ()     Procedure history:    Extracapsular cataract extraction and insertion of intraocular lens (SNOMED CT 843293484) performed by Karan Odom MD on 2015 at 64 Years.  Comments:  3/5/2015 9:30 AM - Melissa Hamlin.  Colonoscopy (SNOMED CT 526822180) performed by Salvador Clark MD on 2013 at 63 Years.  Comments:  2013 8:55 AM - Virgilio Soria MA  Normal colonoscopy repeat in 10 years  Laparoscopic appendectomy (SNOMED CT 40049756) in the month of 2012 at 62 Years.  Colonoscopy (SNOMED CT 481226602) performed by Tl Rodriguez MD on 2003 at 53 Years.  Comments:  7/10/2012 3:07 PM - Rachell Pozo CMA  Every 10 years  Flexible sigmoidoscopy (SNOMED CT 06903667) performed by Jovani Arana MD on 2000 at 49 Years.  Cataract surgery (SNOMED CT 096371086).  Comments:  2015 1:02 PM - Iman Ozuna  left eye  Retinal detachment (SNOMED CT 62598602).  Comments:  2015 1:04 PM - ZeIman herrera  Left eye    2015 1:04 PM - Iman Ozuna  Repaired with laser   Social History:        Alcohol Assessment: Current            Beer (12 oz), 1-2 times per week, 1 drinks/episode average.      Tobacco Assessment: Past            Past, Cigarettes, 10 per day.  Started age 18 Years.  Stopped age 42 Years.            Quit in       Substance Abuse Assessment: Denies  Substance Abuse            Never      Employment and Education Assessment            Retired            Work/School description: [employer].      Home and Environment Assessment            Marital status: .  Spouse/Partner name: Angela.  Lives with Spouse.  1 children.  Living situation:               Home/Independent.  Smoker in household: No.      Nutrition and Health Assessment            Diet: Low Sugar.  Type of diet: Regular.  Caffeine intake amount: Diet Coke, Coffee 1 cup a day.      Exercise and Physical Activity Assessment: Regular exercise            Exercise duration: 60.  Exercise frequency: 3-4 times/week.  Exercise type: Weight lifting.      Sexual Assessment            Sexually active: Yes.  Sexual orientation: Heterosexual.      Other Assessment             w/1 child and 4 step-children        Physical Examination   Vital Signs   6/7/2017 9:19 AM CDT Temperature Temporal 97.4 DegF    Peripheral Pulse Rate 76 bpm    Pulse Site Radial artery    HR Method Manual    Systolic Blood Pressure 122 mmHg    Diastolic Blood Pressure 68 mmHg    Mean Arterial Pressure 86 mmHg    BP Site Left arm    BP Method Manual      Measurements from flowsheet : Measurements   6/7/2017 9:19 AM CDT Height Measured - Standard 68.75 in    Weight Measured - Standard 214.4 lb    BSA 2.17 m2    Body Mass Index 31.89 kg/m2      General:  Alert and oriented, No acute distress.    Eye:  Normal conjunctiva.    HENT:  Normocephalic, Tympanic membranes are clear.         Nose: Discharge ( Small amount, Clear ).    Neck:  Supple, Non-tender, No carotid bruit.    Respiratory:  Lungs are clear to auscultation, Respirations are non-labored, Breath sounds are equal.    Cardiovascular:  Normal rate, Regular rhythm, No murmur, No gallop.    Gastrointestinal:  Soft, Non-tender, Non-distended, Normal bowel sounds.    Integumentary:  Warm, Dry, Pink.    Feet:  Normal by visual exam, Normal pulses, Sensation intact, By monofilament  exam.    Neurologic:  Alert, Oriented, No focal deficits.    Psychiatric:  Cooperative, Appropriate mood & affect.       Review / Management   Results review:  Lab results   5/30/2017 8:00 AM CDT Sodium Level 140 mmol/L    Potassium Level 4.6 mmol/L    Chloride Level 106 mmol/L    CO2 Level 27 mmol/L    Glucose Level 152 mg/dL  HI    BUN 21 mg/dL    Creatinine 1.21 mg/dL    BUN/Creat Ratio NOT APPLICABLE    eGFR 62 mL/min/1.73m2    eGFR African American 71 mL/min/1.73m2    Calcium Level 9.2 mg/dL    Bili Total 1.1 mg/dL    Alk Phos 83 unit/L    AST/SGOT 18 unit/L    ALT/SGPT 16 unit/L    Protein Total 6.6 gm/dL    Albumin Level 4.2 gm/dL    Globulin 2.4    A/G Ratio 1.8    Hgb A1c 6.9  HI    Cholesterol 114 mg/dL  LOW    Non-HDL 78    HDL 36 mg/dL  LOW    Chol/HDL Ratio 3.2    LDL 56    Triglyceride 111 mg/dL    WBC 6.9    RBC 5.25    Hgb 16.0 gm/dL    Hct 46.2 %    MCV 88.0 fL    MCH 30.5 pg    MCHC 34.6 gm/dL    RDW 13.0 %    Platelet 184    MPV 10.4 fL   .       Impression and Plan   Diagnosis     Type 2 diabetes mellitus (WTM52-OB E11.9).     Course:  Worsening.    Orders     Orders   Requests (Return to Office):  Return to Clinic (Request) (Order): RFV: A1C 1 week prior, Return in 3 months.     Orders (Selected)   Outpatient Orders  Ordered  Dietary Consult (Request): Referred to: Catherine Peraza, Reason: DM follow up, Type 2 diabetes mellitus  Prescriptions  Prescribed  metFORMIN 500 mg oral tablet: 1 tab(s) ( 500 mg ), PO, BID, # 60 tab(s), 2 Refill(s), Type: Maintenance, Pharmacy: [facility], 1 tab(s) po bid,x30 day(s).     Diagnosis     Hypertension (HBZ29-CJ I10).     Hyperlipidemia, combined (SVX13-OD E78.5).     Plan:       Diet: Low cholesterol, Low fat, Sodium restricted.    Patient Instructions:       Counseled: Patient, Regarding diagnosis, Regarding treatment, Regarding medications, Diet, Activity, Verbalized understanding.    Orders     Orders (Selected)    Prescriptions  Prescribed  atorvastatin 40 mg oral tablet: 1 tab(s) ( 40 mg ), PO, Daily, # 90 tab(s), 3 Refill(s), Type: Maintenance, Pharmacy: [facility], 1 tab(s) po daily  lisinopril 20 mg oral tablet: 1 tab(s) ( 20 mg ), po, daily, # 90 tab(s), 3 Refill(s), Type: Maintenance, Pharmacy: [facility], 1 tab(s) po daily.     Diagnosis     Seasonal allergies (NCF58-VU J30.2).     Orders     Will use OTC loratadine.     Diagnosis     Erectile Dysfunction (JAE98-GV N52.9).     Orders     Orders (Selected)   Prescriptions  Prescribed  Cialis 20 mg oral tablet: 1 tab(s) ( 20 mg ), po, daily, # 8 tab(s), 3 Refill(s), Type: Maintenance, Pharmacy: [facility], 1 tab(s) po daily.

## 2022-02-16 NOTE — NURSING NOTE
Comprehensive Intake Entered On:  9/8/2020 11:13 AM CDT    Performed On:  9/8/2020 11:08 AM CDT by Kim Goddard CMA               Summary   Chief Complaint :   Suture removal   Menstrual Status :   N/A   Weight Measured :   200 lb(Converted to: 200 lb 0 oz, 90.72 kg)    Height/Length Estimated :   68.25 in(Converted to: 5 ft 8 in, 173.35 cm)    Systolic Blood Pressure :   128 mmHg   Diastolic Blood Pressure :   76 mmHg   Mean Arterial Pressure :   93 mmHg   Peripheral Pulse Rate :   64 bpm   BP Site :   Right arm   BP Method :   Electronic   Temperature Tympanic :   97.7 DegF(Converted to: 36.5 DegC)  (LOW)    Kim Goddard CMA - 9/8/2020 11:08 AM CDT   Health Status   Allergies Verified? :   Yes   Medication History Verified? :   Yes   Immunizations Current :   Yes   Medical History Verified? :   Yes   Pre-Visit Planning Status :   Completed   Tobacco Use? :   Former smoker   Kim Goddard CMA - 9/8/2020 11:08 AM CDT   Consents   Consent for Immunization Exchange :   Consent Granted   Consent for Immunizations to Providers :   Consent Granted   Kim Goddard CMA - 9/8/2020 11:08 AM CDT   Meds / Allergies   (As Of: 9/8/2020 11:13:48 AM CDT)   Allergies (Active)   No Known Medication Allergies  Estimated Onset Date:   Unspecified ; Created By:   Rosie Cramer CMA; Reaction Status:   Active ; Category:   Drug ; Substance:   No Known Medication Allergies ; Type:   Allergy ; Updated By:   Rosie Cramer CMA; Reviewed Date:   9/8/2020 11:11 AM CDT        Medication List   (As Of: 9/8/2020 11:13:48 AM CDT)   Prescription/Discharge Order    atorvastatin  :   atorvastatin ; Status:   Prescribed ; Ordered As Mnemonic:   atorvastatin 40 mg oral tablet ; Simple Display Line:   40 mg, 1 tab(s), PO, Daily, 90 tab(s), 3 Refill(s) ; Ordering Provider:   Sree Arana MD; Catalog Code:   atorvastatin ; Order Dt/Tm:   5/19/2020 8:51:26 AM CDT          indomethacin  :   indomethacin ; Status:   Prescribed ; Ordered As  Mnemonic:   indomethacin 50 mg oral capsule ; Simple Display Line:   50 mg, 1 cap(s), Oral, tid, PRN: for gout pain, 30 cap(s), 0 Refill(s) ; Ordering Provider:   Sree Arana MD; Catalog Code:   indomethacin ; Order Dt/Tm:   7/9/2019 9:35:12 AM CDT          lisinopril  :   lisinopril ; Status:   Prescribed ; Ordered As Mnemonic:   lisinopril 20 mg oral tablet ; Simple Display Line:   20 mg, 1 tab(s), po, daily, 90 tab(s), 3 Refill(s) ; Ordering Provider:   Sree Arana MD; Catalog Code:   lisinopril ; Order Dt/Tm:   5/19/2020 8:51:26 AM CDT          metFORMIN  :   metFORMIN ; Status:   Prescribed ; Ordered As Mnemonic:   metFORMIN 500 mg oral tablet ; Simple Display Line:   1,000 mg, 2 tab(s), PO, BID, for 90 day(s), 360 tab(s), 3 Refill(s) ; Ordering Provider:   Sree Arana MD; Catalog Code:   metFORMIN ; Order Dt/Tm:   9/1/2020 9:34:09 AM CDT          tadalafil  :   tadalafil ; Status:   Prescribed ; Ordered As Mnemonic:   Cialis 20 mg oral tablet ; Simple Display Line:   20 mg, 1 tab(s), po, daily, 30 tab(s), 3 Refill(s) ; Ordering Provider:   Sree Arana MD; Catalog Code:   tadalafil ; Order Dt/Tm:   5/19/2020 8:51:27 AM CDT            Home Meds    aspirin  :   aspirin ; Status:   Documented ; Ordered As Mnemonic:   aspirin 81 mg oral tablet ; Simple Display Line:   81 mg, 1 tab(s), po, daily, 0 Refill(s) ; Catalog Code:   aspirin ; Order Dt/Tm:   6/7/2017 9:21:57 AM CDT            ID Risk Screen   Recent Travel History :   No recent travel   Family Member Travel History :   No recent travel   Other Exposure to Infectious Disease :   Unknown   Kim Goddard CMA - 9/8/2020 11:08 AM CDT

## 2022-02-16 NOTE — TELEPHONE ENCOUNTER
Entered by Sree Arana MD on September 28, 2021 6:48:47 AM CDT  ---------------------  From: Sree Arana MD   To: [place] [id number]    Sent: 9/28/2021 6:48:47 AM CDT  Subject: Medication Management     ** Approved with modifications: **  Miscellaneous Prescription (metFORMIN HCl 500 MG Oral Tablet)  Take 2 tablets by mouth twice daily  Qty:  360 tab(s)        Days Supply:  90        Refills:  0          Substitutions Allowed     Route To Pharmacy - [place] [address]               ------------------------------------------  From: [place] [address]  To: Sree Arana MD  Sent: September 27, 2021 2:34:26 PM CDT  Subject: Medication Management  Due: September 17, 2021 11:19:24 PM CDT     ** On Hold Pending Signature **     Dispensed Drug: metFORMIN HCl 500 MG Oral Tablet, Take 2 tablets by mouth twice daily  Quantity: 360 tab(s)  Days Supply: 90  Refills: 0  Substitutions Allowed  Notes from Pharmacy:  ------------------------------------------

## 2022-02-16 NOTE — PROGRESS NOTES
Patient:   SUDHIR PINEDO            MRN: [id number]            FIN: [id number]               Age:   71 years     Sex:  Male     :  1950   Associated Diagnoses:   Impacted cerumen   Author:   Sree Arana MD      Procedure   Ear foreign body removal procedure   Date/ Time:  2021 3:37:00 PM.     Confirmed: patient.     Performed by: self.     Informed consent: Verbally obtained.     Indication: hearing disturbance, impacted Cerumen.     Location: left ear, right ear.     Preparation and technique: positioned sitting upright, method including (cerumen loop, curette, irrigation with warm tap water, otologic syringe).     Results: foreign body removal complete.     Procedure tolerated: well.     No Complications.        Impression and Plan   Diagnosis     Impacted cerumen (QTA47-GS H61.23).     Orders     F/U  if not improving, sooner if getting worse.

## 2022-02-16 NOTE — NURSING NOTE
Depression Screening Entered On:  5/31/2019 9:08 AM CDT    Performed On:  5/31/2019 9:07 AM CDT by Emily Rodriguez MA               Depression Screening   Little Interest - Pleasure in Activities :   Not at all   Feeling Down, Depressed, Hopeless :   Not at all   Initial Depression Screen Score :   0    Trouble Falling or Staying Asleep :   Not at all   Feeling Tired or Little Energy :   Not at all   Poor Appetite or Overeating :   Not at all   Feeling Bad About Yourself :   Not at all   Trouble Concentrating :   Not at all   Moving or Speaking Slowly :   Not at all   Thoughts Better Off Dead or Hurting Self :   Not at all   Detailed Depression Screen Score :   0    Total Depression Screen Score :   0    MERLY Difficulty with Work, Home, Others :   Not difficult at all   Emily Rodriguez MA - 5/31/2019 9:07 AM CDT

## 2022-02-16 NOTE — NURSING NOTE
Comprehensive Intake Entered On:  5/19/2020 8:05 AM CDT    Performed On:  5/19/2020 8:02 AM CDT by Lori Muse CMA               Summary   Chief Complaint :   Phone Visit: DM Med Check, HTN Med Check, Review Labs, Verbal Consent for phone visit   Menstrual Status :   N/A   Lori Muse CMA - 5/19/2020 8:02 AM CDT   Health Status   Allergies Verified? :   Yes   Medication History Verified? :   Yes   Immunizations Current :   Yes   Medical History Verified? :   Yes   Pre-Visit Planning Status :   Completed   Tobacco Use? :   Former smoker   Lori Muse CMA - 5/19/2020 8:02 AM CDT   Consents   Consent for Immunization Exchange :   Consent Granted   Consent for Immunizations to Providers :   Consent Granted   Lori Muse CMA - 5/19/2020 8:02 AM CDT   Meds / Allergies   (As Of: 5/19/2020 8:05:14 AM CDT)   Allergies (Active)   No Known Medication Allergies  Estimated Onset Date:   Unspecified ; Created By:   Rosie Cramer CMA; Reaction Status:   Active ; Category:   Drug ; Substance:   No Known Medication Allergies ; Type:   Allergy ; Updated By:   Rosie Cramer CMA; Reviewed Date:   5/19/2020 8:03 AM CDT        Medication List   (As Of: 5/19/2020 8:05:14 AM CDT)   Prescription/Discharge Order    atorvastatin  :   atorvastatin ; Status:   Prescribed ; Ordered As Mnemonic:   atorvastatin 40 mg oral tablet ; Simple Display Line:   40 mg, 1 tab(s), PO, Daily, 90 tab(s), 3 Refill(s) ; Ordering Provider:   Sree Arana MD; Catalog Code:   atorvastatin ; Order Dt/Tm:   5/31/2019 8:56:28 AM CDT          indomethacin  :   indomethacin ; Status:   Prescribed ; Ordered As Mnemonic:   indomethacin 50 mg oral capsule ; Simple Display Line:   50 mg, 1 cap(s), Oral, tid, PRN: for gout pain, 30 cap(s), 0 Refill(s) ; Ordering Provider:   Sree Arana MD; Catalog Code:   indomethacin ; Order Dt/Tm:   7/9/2019 9:35:12 AM CDT          lisinopril  :   lisinopril ; Status:   Prescribed ; Ordered As Mnemonic:    lisinopril 20 mg oral tablet ; Simple Display Line:   20 mg, 1 tab(s), po, daily, 90 tab(s), 3 Refill(s) ; Ordering Provider:   Sree Arana MD; Catalog Code:   lisinopril ; Order Dt/Tm:   5/31/2019 8:56:28 AM CDT          metFORMIN  :   metFORMIN ; Status:   Prescribed ; Ordered As Mnemonic:   metFORMIN 500 mg oral tablet ; Simple Display Line:   500 mg, 1 tab(s), PO, BID, 180 tab(s), 3 Refill(s) ; Ordering Provider:   Sree Arana MD; Catalog Code:   metFORMIN ; Order Dt/Tm:   5/31/2019 8:55:18 AM CDT          tadalafil  :   tadalafil ; Status:   Prescribed ; Ordered As Mnemonic:   Cialis 20 mg oral tablet ; Simple Display Line:   20 mg, 1 tab(s), po, daily, FAX [phone], 28 tab(s), 3 Refill(s) ; Ordering Provider:   Sree Arana MD; Catalog Code:   tadalafil ; Order Dt/Tm:   5/31/2019 8:57:29 AM CDT            Home Meds    aspirin  :   aspirin ; Status:   Documented ; Ordered As Mnemonic:   aspirin 81 mg oral tablet ; Simple Display Line:   81 mg, 1 tab(s), po, daily, 0 Refill(s) ; Catalog Code:   aspirin ; Order Dt/Tm:   6/7/2017 9:21:57 AM CDT            ID Risk Screen   Recent Travel History :   No recent travel   Family Member Travel History :   No recent travel   Other Exposure to Infectious Disease :   Unknown   Lori Muse CMA - 5/19/2020 8:02 AM CDT

## 2022-02-16 NOTE — NURSING NOTE
CAGE Assessment Entered On:  5/28/2021 9:12 AM CDT    Performed On:  5/28/2021 9:12 AM CDT by Kim Goddard CMA               Assessment   Have you ever felt you should cut down on your drinking :   No   Have people annoyed you by criticizing your drinking :   No   Have you ever felt bad or guilty about your drinking :   No   Have you ever taken a drink first thing in the morning to steady your nerves or get rid of a hangover (Eye-opener) :   No   CAGE Score :   0    Kim Goddard CMA - 5/28/2021 9:12 AM CDT

## 2022-02-16 NOTE — PROGRESS NOTES
Patient:   SUDHIR PINEDO            MRN: [id number]            FIN: [id number]               Age:   67 years     Sex:  Male     :  1950   Associated Diagnoses:   Type 2 diabetes mellitus   Author:   Sree Arana MD      Visit Information      Date of Service: 2017 08:12 am  Performing Location: [facility]  Encounter#: [id number]      Primary Care Provider (PCP):  Sree Arana MD    NPI# [id number]      Referring Provider:  Sree Arana MD# [id number]      Chief Complaint   2017 8:19 AM CDT    3 month DM med check, review labs     Chief complaint and symptoms noted above confirmed with patient.      History of Present Illness             The patient presents for follow-up evaluation of diabetes.  The quality of the patient's diabetes is described as being unchanged from previous visit.  Exacerbating factors consist of none.  Relieving factors consist of increased activity and medication.  Glucose results: within target range.  Hemoglobin A1c results: > 6% and within target range.        Review of Systems            Health Status   Allergies:    Allergic Reactions (Selected)  No Known Medication Allergies   Medications:  (Selected)   Prescriptions  Prescribed  Cialis 20 mg oral tablet: 1 tab(s) ( 20 mg ), po, daily, # 8 tab(s), 3 Refill(s), Type: Maintenance, Pharmacy: [id number], 1 tab(s) po daily  Indocin 50 mg oral capsule: 1 cap(s) ( 50 mg ), PO, TID, PRN: for gout pain, # 30 cap(s), 0 Refill(s), Type: Maintenance, Pharmacy: [id number], 1 cap(s) po tid,PRN:for gout pain  atorvastatin 40 mg oral tablet: 1 tab(s) ( 40 mg ), PO, Daily, # 90 tab(s), 3 Refill(s), Type: Maintenance, Pharmacy: [facility], 1 tab(s) po daily  lisinopril 20 mg oral tablet: 1 tab(s) ( 20 mg ), po, daily, # 90 tab(s), 3 Refill(s), Type: Maintenance, Pharmacy: [facility], 1 tab(s) po daily  metFORMIN 500 mg oral tablet: 1 tab(s) (  500 mg ), PO, BID, # 180 tab(s), 3 Refill(s), Type: Maintenance, Pharmacy: [facility], 1 tab(s) po bid  Documented Medications  Documented  aspirin 81 mg oral tablet: 1 tab(s) ( 81 mg ), po, daily, 0 Refill(s), Type: Maintenance   Problem list:    All Problems (Selected)  Erectile Dysfunction / ICD-9-.84 / Confirmed  Hemorrhoids / ICD-9-.6 / Confirmed  Hyperlipidemia, combined / ICD-9-.4 / Confirmed  Hypertension / SNOMED CT 0271169759 / Confirmed  Glucose intolerance (impaired glucose tolerance) / SNOMED CT 52441196 / Confirmed  Obese / ICD-9-.00 / Probable  Prostatitis, unspecified / SNOMED CT 96081040 / Confirmed  Type 2 diabetes mellitus / SNOMED CT 875299497 / Confirmed      Histories   Past Medical History:    Active  Hemorrhoids (ICD-9-.6)  Prostatitis, unspecified (SNOMED CT 40797958)  Hypertension (SNOMED CT 9160662043)  Erectile Dysfunction (ICD-9-.84)  Glucose intolerance (impaired glucose tolerance) (SNOMED CT 35099401)  Obese (ICD-9-.00)  Hyperlipidemia, combined (ICD-9-.4)   Family History:    Hypertension  Father ()  Mother ()  Heart disease  Father ()     Procedure history:    Extracapsular cataract extraction and insertion of intraocular lens (SNOMED CT 911331370) performed by Karan Odom MD on 2015 at 64 Years.  Comments:  3/5/2015 9:30 AM - Melissa Hamlin.  Colonoscopy (SNOMED CT 541314806) performed by Salvador Clark MD on 2013 at 63 Years.  Comments:  2013 8:55 AM - Virgilio Soria MA  Normal colonoscopy repeat in 10 years  Laparoscopic appendectomy (SNOMED CT 23367988) in the month of 2012 at 62 Years.  Colonoscopy (SNOMED CT 131214184) performed by Tl Rodriguez MD on 2003 at 53 Years.  Comments:  7/10/2012 3:07 PM - Rachell Pozo CMA  Every 10 years  Flexible sigmoidoscopy (SNOMED CT 56451276) performed by Sree Arana MD on 2000 at 49 Years.  Cataract surgery (SNOMED  CT 915057198).  Comments:  6/9/2015 1:02 PM - Iman Ozuna  left eye  Retinal detachment (SNOMED CT 18792951).  Comments:  6/9/2015 1:04 PM - Iman Ozuna  Left eye    6/9/2015 1:04 PM - Iman Ozuna  Repaired with laser   Social History:        Alcohol Assessment: Current            Beer (12 oz), 1-2 times per week, 1 drinks/episode average.      Tobacco Assessment: Past            Past, Cigarettes, 10 per day.  Started age 18 Years.  Stopped age 42 Years.            Quit in 1991      Substance Abuse Assessment: Denies Substance Abuse            Never      Employment and Education Assessment            Retired            Work/School description: .      Home and Environment Assessment            Marital status: .  Spouse/Partner name: Angela.  Lives with Spouse.  1 children.  Living situation:               Home/Independent.  Smoker in household: No.      Nutrition and Health Assessment            Diet: Low Sugar.  Type of diet: Regular.  Caffeine intake amount: Diet Coke, Coffee 1 cup a day.      Exercise and Physical Activity Assessment: Regular exercise            Exercise duration: 60.  Exercise frequency: 3-4 times/week.  Exercise type: Weight lifting.      Sexual Assessment            Sexually active: Yes.  Sexual orientation: Heterosexual.      Other Assessment             w/1 child and 4 step-children        Physical Examination   Vital Signs   9/12/2017 8:19 AM CDT Temperature Tympanic 97.6 DegF  LOW    Peripheral Pulse Rate 68 bpm    Pulse Site Radial artery    HR Method Manual    Systolic Blood Pressure 126 mmHg    Diastolic Blood Pressure 68 mmHg    Mean Arterial Pressure 87 mmHg    BP Site Left arm    BP Method Manual      Measurements from flowsheet : Measurements   9/12/2017 8:19 AM CDT Height Measured - Standard 68.75 in    Weight Measured - Standard 216.4 lb    BSA 2.18 m2    Body Mass Index 32.19 kg/m2         Review / Management   Results review:  Lab  results   9/6/2017 8:23 AM CDT Hgb A1c 6.3  HI    U Microalbumin 0.6 mg/dL    Microalbumin Comment See comment   .       Impression and Plan   Diagnosis     Type 2 diabetes mellitus (WAL88-CW E11.9).     Course:  Well controlled.    Orders     Orders (Selected)   Prescriptions  Prescribed  metFORMIN 500 mg oral tablet: 1 tab(s) ( 500 mg ), PO, BID, # 180 tab(s), 3 Refill(s), Type: Maintenance, Pharmacy: [id number], 1 tab(s) po bid.     Will be switching pharmacies to [facility] mail order in a month.

## 2022-02-16 NOTE — PROGRESS NOTES
Patient:   SUDHIR PINEDO            MRN: [id number]            FIN: [id number]               Age:   68 years     Sex:  Male     :  1950   Associated Diagnoses:   Atypical mole   Author:   Sree Arana MD      Visit Information      Date of Service: 2019 08:05 am  Performing Location: [facility]  Encounter#: [id number]      Primary Care Provider (PCP):  rSee Arana MD    NPI# [id number]      Referring Provider:  Sree Arana MD# [id number]      Procedure   Biopsy procedure   Date/ Time:  2019 9:15:00 AM.     Confirmed: patient, procedure, side, site, safety procedures followed.     Performed by: self.     Informed consent: signed by patient.     Indication: diagnostic evaluation, lesion, enlargement.     Preparation and technique: skin prepped (in usual sterile fashion, with alcohol), local anesthesia 1% lidocaine without epinephrine, technique shave biopsy, hemostasis achieved (using electrocautery, using direct pressure, using medication:  Chloral Hydrate).     Site #  1: posterior, scalp, size and depth (length  1.5  cm, width  1.5  cm, superficial), specimen sent to pathology.     Completion: 1  lesions biopsied.     Procedure tolerated: well.     No Complications.        Impression and Plan   Diagnosis     Atypical mole (VOE84-JO L81.4).

## 2022-02-16 NOTE — PROGRESS NOTES
Patient:   SUDHIR PINEDO            MRN: [id number]            FIN: [id number]               Age:   68 years     Sex:  Male     :  1950   Associated Diagnoses:   Type 2 diabetes mellitus, controlled, with renal complications; Hypertension; Hyperlipidemia, combined; Erectile Dysfunction   Author:   Sree Arana MD      Report Summary   DiagnosisCourse:  Well controlled. Orders  Orders   Requests (Return to Office):  Return to Clinic (Request) (Order): RFV: DM Renal check, A1C and basic 1 week prior., Return in 3 months.     Visit Information      Date of Service: 2019 08:18 am  Performing Location: [facility]  Encounter#: [id number]      Primary Care Provider (PCP):  Sree Arana MD# [id number]      Referring Provider:  Sree Arana MD# [id number]   Visit type:  Scheduled follow-up.    Source of history:  Self.    History limitation:  None.       Chief Complaint   2019 8:34 AM CDT    HTN med check     Chief complaint and symptoms noted above confirmed with patient.      History of Present Illness             The patient presents for follow-up evaluation of diabetes.  The quality of the patient's diabetes is described as being unchanged from previous visit.  Associated symptoms consist of none.  Compliance problems: none.  Glucose results: within target range.  Hemoglobin A1c results: > 6% and within target range.               The patient presents for follow-up evaluation of hypertension.  The quality of hypertension symptom(s) since the patient's last visit is described as being unchanged.  The severity of the hypertension symptom(s) since the last visit is moderate.  Since the patient's last visit, the timing/course of hypertension symptom(s) is constant.  Exacerbating factors consist of none.  Relieving factors consist of medication.        Review of Systems   Constitutional:  Negative.    Respiratory:  Negative.    Cardiovascular:  Negative.     Gastrointestinal:  Negative.       Health Status   Allergies:    Allergic Reactions (Selected)  No Known Medication Allergies   Medications:  (Selected)   Prescriptions  Prescribed  Cialis 20 mg oral tablet: = 1 tab(s) ( 20 mg ), po, daily, Instructions: FAX [phone], # 28 tab(s), 3 Refill(s), Type: Maintenance  Indocin 50 mg oral capsule: 1 cap(s) ( 50 mg ), PO, TID, PRN: for gout pain, # 30 cap(s), 0 Refill(s), Type: Maintenance, Pharmacy: [id number], 1 cap(s) po tid,PRN:for gout pain  atorvastatin 40 mg oral tablet: 1 tab(s) ( 40 mg ), PO, Daily, # 90 tab(s), 3 Refill(s), Type: Maintenance, Pharmacy: [facility], Patient coming today to fill as he is out., 1 tab(s) po daily  lisinopril 20 mg oral tablet: 1 tab(s) ( 20 mg ), po, daily, # 90 tab(s), 3 Refill(s), Type: Maintenance, Pharmacy: [facility], 1 tab(s) po daily  metFORMIN 500 mg oral tablet: 1 tab(s) ( 500 mg ), PO, BID, # 180 tab(s), 3 Refill(s), Type: Maintenance, Pharmacy: [facility], 1 tab(s) po bid  Documented Medications  Documented  aspirin 81 mg oral tablet: 1 tab(s) ( 81 mg ), po, daily, 0 Refill(s), Type: Maintenance   Problem list:    All Problems  Erectile Dysfunction / ICD-9-.84 / Confirmed  Hemorrhoids / ICD-9-.6 / Confirmed  Hyperlipidemia, combined / ICD-9-.4 / Confirmed  Hypertension / SNOMED CT 1247432186 / Confirmed  Obese / ICD-9-.00 / Probable  Prostatitis, unspecified / SNOMED CT 56943217 / Confirmed  Type 2 diabetes mellitus / SNOMED CT 512053155 / Confirmed  Inactive: Glucose intolerance (impaired glucose tolerance) / SNOMED CT 73841218  Inactive: Tobacco abuse / ICD-9-.1      Histories   Past Medical History:    Active  Hemorrhoids (ICD-9-.6)  Prostatitis, unspecified (SNOMED CT 06005784)  Hypertension (SNOMED CT 1754402999)  Erectile Dysfunction (ICD-9-.84)  Obese (ICD-9-.00)  Hyperlipidemia, combined (ICD-9-.4)  Type 2  diabetes mellitus (SNOMED CT 395788830)   Family History:    Hypertension  Father ()  Mother ()  Heart disease  Father ()     Procedure history:    Extracapsular cataract extraction and insertion of intraocular lens (SNOMED CT 559021854) performed by Karan Odom MD on 2015 at 64 Years.  Comments:  3/5/2015 9:30 AM CST - Melissa Hamlin  Left.  Colonoscopy (SNOMED CT 164676057) performed by Salvador Clark MD on 2013 at 63 Years.  Comments:  2013 8:55 AM CDT - Virgilio Soria MA  Normal colonoscopy repeat in 10 years  Laparoscopic appendectomy (SNOMED CT 77268098) in the month of 2012 at 62 Years.  Colonoscopy (SNOMED CT 019931458) performed by Tl Rodriguez MD on 2003 at 53 Years.  Comments:  7/10/2012 3:07 PM CDT - Rachell Pozo CMA  Every 10 years  Flexible sigmoidoscopy (SNOMED CT 31116970) performed by Sree Arana MD on 2000 at 49 Years.  Cataract surgery (SNOMED CT 248294534).  Comments:  2015 1:02 PM CDT - Iman Ozuna  left eye  Retinal detachment (SNOMED CT 10419386).  Comments:  2015 1:04 PM CDT - Laith Iman  Left eye    2015 1:04 PM CDT - Laith Iman  Repaired with laser  Crown of tooth (SNOMED CT 058433799).  Comments:  2018 9:19 AM CDT - Saritha Alberto CMA  2018   Social History:        Alcohol Assessment: Current            Beer (12 oz), 1-2 times per week, 1 drinks/episode average.      Tobacco Assessment: Past            Past, Cigarettes, 10 per day.  Started age 18 Years.  Stopped age 42 Years.            Quit in       Substance Abuse Assessment: Denies Substance Abuse            Never      Employment and Education Assessment            Retired            Work/School description: [employer].      Home and Environment Assessment            Marital status: .  Spouse/Partner name: Angela.  Lives with Spouse.  1 children.  Living situation:               Home/Independent.  Smoker in household:  No.      Nutrition and Health Assessment            Diet: Low Sugar.  Type of diet: Regular.  Caffeine intake amount: Diet Coke, Coffee 1 cup a day.      Exercise and Physical Activity Assessment: Regular exercise            Exercise duration: 60.  Exercise frequency: 3-4 times/week.  Exercise type: Weight lifting.      Sexual Assessment            Sexually active: Yes.  Sexual orientation: Heterosexual.      Other Assessment             w/1 child and 4 step-children      Physical Examination   Vital Signs   5/31/2019 8:34 AM CDT Temperature Tympanic 97.7 DegF  LOW    Peripheral Pulse Rate 64 bpm    Pulse Site Radial artery    HR Method Manual    Systolic Blood Pressure 122 mmHg    Diastolic Blood Pressure 74 mmHg    Mean Arterial Pressure 90 mmHg    BP Site Left arm    BP Method Manual      Measurements from flowsheet : Measurements   5/31/2019 8:34 AM CDT Height Measured - Standard 68.25 in    Weight Measured - Standard 212.4 lb    BSA 2.15 m2    Body Mass Index 32.06 kg/m2  HI      General:  Alert and oriented, No acute distress.    Eye:  Normal conjunctiva.    HENT:  Normocephalic, Tympanic membranes are clear.    Neck:  Supple, Non-tender, No carotid bruit.    Respiratory:  Lungs are clear to auscultation, Respirations are non-labored, Breath sounds are equal.    Cardiovascular:  Normal rate, Regular rhythm, No murmur, No gallop.    Gastrointestinal:  Soft, Non-tender, Non-distended, Normal bowel sounds.    Integumentary:  Warm, Dry, Pink.    Feet:  Normal by visual exam, Normal pulses, Sensation intact, By monofilament exam.    Neurologic:  Alert, Oriented, No focal deficits.    Psychiatric:  Cooperative, Appropriate mood & affect.       Review / Management   Results review:  Lab results   5/23/2019 8:15 AM CDT Sodium Level 140 mmol/L    Potassium Level 5.0 mmol/L    Chloride Level 105 mmol/L    CO2 Level 28 mmol/L    Glucose Level 160 mg/dL  HI    BUN 24 mg/dL    Creatinine 1.47 mg/dL  HI    BUN/Creat  Ratio 16    eGFR 48 mL/min/1.73m2  LOW    eGFR African American 56 mL/min/1.73m2  LOW    Calcium Level 9.3 mg/dL    Bili Total 0.9 mg/dL    Alk Phos 66 unit/L    AST/SGOT 15 unit/L    ALT/SGPT 17 unit/L    Protein Total 6.1 gm/dL    Albumin Level 4.2 gm/dL    Globulin 1.9    A/G Ratio 2.2    Cholesterol 130 mg/dL    Non-HDL 91    HDL 39 mg/dL  LOW    Chol/HDL Ratio 3.3    LDL 72    Triglyceride 111 mg/dL    U Microalbumin 0.7 mg/dL    Microalbumin Comment See comment    WBC 6.3    RBC 5.08    Hgb 15.9 gm/dL    Hct 45.4 %    MCV 89.4 fL    MCH 31.3 pg    MCHC 35.0 gm/dL    RDW 12.8 %    Platelet 186    MPV 10.4 fL   4/17/2019 8:02 AM CDT Hgb A1c 6.8  HI   .       Impression and Plan   Diagnosis     Type 2 diabetes mellitus, controlled, with renal complications (XAY94-LP E11.29).     Course:  Well controlled.    Orders     Orders   Requests (Return to Office):  Return to Clinic (Request) (Order): RFV: DM Renal check, A1C and basic 1 week prior., Return in 3 months.     Diagnosis     Hyperlipidemia, combined (DBZ65-NM E78.5).     Hypertension (VUH26-MK I10).     Plan:       Diet: Low cholesterol, Low fat, Sodium restricted.    Patient Instructions:       Counseled: Patient, Regarding diagnosis, Regarding treatment, Regarding medications, Diet, Activity, Verbalized understanding.    Orders     Orders (Selected)   Prescriptions  Prescribe  atorvastatin 40 mg oral tablet: = 1 tab(s) ( 40 mg ), PO, Daily, # 90 tab(s), 3 Refill(s), Type: Maintenance, Pharmacy: [facility], Patient coming today to fill as he is out., 1 tab(s) Oral daily  lisinopril 20 mg oral tablet: = 1 tab(s) ( 20 mg ), po, daily, # 90 tab(s), 3 Refill(s), Type: Maintenance, Pharmacy: [facility], 1 tab(s) Oral daily.     Diagnosis     Erectile Dysfunction (NSA53-CL N52.9).     Orders     Orders (Selected)   Prescriptions  Prescribe  Cialis 20 mg oral tablet: = 1 tab(s) ( 20 mg ), po, daily, Instructions: FAX [phone], # 28  tab(s), 3 Refill(s), Type: Maintenance.

## 2022-02-16 NOTE — LETTER
(Inserted Image. Unable to display)   September 01, 2021    [place] [address]            Dear SUDHIR,      Thank you for selecting [facility] for your healthcare needs.    Our records indicate you are due for the following services:     Non-Fasting Labs.    If you had your labs done at another facility or with Direct Access Lab Testing at [facility], please bring in a copy of the results to your next visit, mail a copy, or drop off a copy of your results to your Healthcare Provider.    (FYI   Regarding office visits: In some instances, a video visit or telephone visit may be offered as an option.)      To schedule an appointment or if you have further questions, please contact your clinic at [phone].      Powered by [employer]    Sincerely,    Sree Arana MD

## 2022-02-16 NOTE — NURSING NOTE
CAGE Assessment Entered On:  5/31/2019 9:07 AM CDT    Performed On:  5/31/2019 9:07 AM CDT by Emily Rodriguez MA               Assessment   Have you ever felt you should cut down on your drinking :   No   Have people annoyed you by criticizing your drinking :   No   Have you ever felt bad or guilty about your drinking :   No   Have you ever taken a drink first thing in the morning to steady your nerves or get rid of a hangover (Eye-opener) :   No   CAGE Score :   0    Emily Rodriguez MA - 5/31/2019 9:07 AM CDT

## 2022-02-16 NOTE — LETTER
(Inserted Image. Unable to display)     [address] [place] [address] [phone] (phone)  [phone] (fax)  SUDHIR PINEDO    [address]   [place], [place] [id number]        Dear SUDHIR,    Thank you for selecting our practice as your care provider. Below you will find the results and recent diagnostic testings outcomes we have reviewed.        Please make an appointment for a televisit or better yet a video visit to discuss these results.      Result Name Current Result Previous Result Reference Range   Sodium Level (mmol/L)  136 5/12/2020  139 9/12/2019   140 5/23/2019   141 5/29/2018 135 - 146   Potassium Level (mmol/L)  4.8 5/12/2020  4.9 9/12/2019   5.0 5/23/2019   4.8 5/29/2018 3.5 - 5.3   Chloride Level (mmol/L)  101 5/12/2020  105 9/12/2019   105 5/23/2019   107 5/29/2018 98 - 110   CO2 Level (mmol/L)  27 5/12/2020  28 9/12/2019   28 5/23/2019   26 5/29/2018 20 - 32   Glucose Level (mg/dL) ((H)) 337 5/12/2020 ((H)) 149 9/12/2019  ((H)) 160 5/23/2019  ((H)) 151 5/29/2018 65 - 99   BUN (mg/dL)  21 5/12/2020  20 9/12/2019   24 5/23/2019   23 5/29/2018 7 - 25   Creatinine Level (mg/dL)  1.07 5/12/2020  1.13 9/12/2019  ((H)) 1.47 5/23/2019   1.23 5/29/2018 0.70 - 1.18   eGFR (mL/min/1.73m2)  70 5/12/2020  65 9/12/2019  ((L)) 48 5/23/2019   60 5/29/2018 > OR = 60 -    Calcium Level (mg/dL)  9.6 5/12/2020  9.2 9/12/2019   9.3 5/23/2019   9.5 5/29/2018 8.6 - 10.3   Bilirubin Total (mg/dL)  0.9 5/12/2020  0.9 5/23/2019   1.1 5/29/2018 0.2 - 1.2   Alkaline Phosphatase (unit/L)  78 5/12/2020  66 5/23/2019   76 5/29/2018 35 - 144   AST/SGOT (unit/L)  15 5/12/2020  15 5/23/2019   15 5/29/2018 10 - 35   ALT/SGPT (unit/L)  16 5/12/2020  17 5/23/2019   16 5/29/2018 9 - 46   Protein Total (gm/dL)  6.3 5/12/2020  6.1 5/23/2019   6.5 5/29/2018 6.1 - 8.1   Albumin Level (gm/dL)  4.4 5/12/2020  4.2 5/23/2019   4.2 5/29/2018 3.6 - 5.1   Globulin  1.9 5/12/2020  1.9 5/23/2019   2.3 5/29/2018 1.9 - 3.7   A/G Ratio   2.3 5/12/2020  2.2 5/23/2019   1.8 5/29/2018 1.0 - 2.5   Hgb A1c ((H)) 12.7 5/12/2020 ((H)) 7.1 9/12/2019  ((H)) 6.8 4/17/2019  ((H)) 8.1 12/19/2018  - <5.7   Cholesterol (mg/dL)  128 5/12/2020  130 5/23/2019   126 5/29/2018  - <200   Non-HDL Cholesterol  91 5/12/2020  91 5/23/2019   88 5/29/2018  - <130   HDL (mg/dL) ((L)) 37 5/12/2020 ((L)) 39 5/23/2019  ((L)) 38 5/29/2018 > OR = 40 -    Cholesterol/HDL Ratio  3.5 5/12/2020  3.3 5/23/2019   3.3 5/29/2018  - <5.0   LDL  67 5/12/2020  72 5/23/2019   71 5/29/2018    Triglyceride (mg/dL) ((H)) 165 5/12/2020  111 5/23/2019   88 5/29/2018  - <150   U Microalbumin (mg/dL)  0.3 5/12/2020  0.7 5/23/2019   2.1 12/19/2018 See Note: -    Microalbumin Comment  See comment 5/12/2020  See comment 5/23/2019   See comment 12/19/2018    WBC  6.6 5/12/2020  6.3 5/23/2019   6.5 5/29/2018 3.8 - 10.8   RBC  4.99 5/12/2020  5.08 5/23/2019   5.05 5/29/2018 4.20 - 5.80   Hgb (gm/dL)  16.0 5/12/2020  15.9 5/23/2019   15.5 5/29/2018 13.2 - 17.1   Hct (%)  44.2 5/12/2020  45.4 5/23/2019   45.1 5/29/2018 38.5 - 50.0   MCV (fL)  88.6 5/12/2020  89.4 5/23/2019   89.3 5/29/2018 80.0 - 100.0   MCH (pg)  32.1 5/12/2020  31.3 5/23/2019   30.7 5/29/2018 27.0 - 33.0   MCHC (gm/dL) ((H)) 36.2 5/12/2020  35.0 5/23/2019   34.4 5/29/2018 32.0 - 36.0   RDW (%)  12.8 5/12/2020  12.8 5/23/2019   13.2 5/29/2018 11.0 - 15.0   Platelet  180 5/12/2020  186 5/23/2019   179 5/29/2018 140 - 400   MPV (fL)  11.1 5/12/2020  10.4 5/23/2019   10.0 5/29/2018 7.5 - 12.5       Please contact my practice at the number listed above if you have any questions or concerns.     Sincerely,        Sree Arana MD, FAAFP

## 2022-02-16 NOTE — LETTER
(Inserted Image. Unable to display)   September 03, 2019      SUDHIR PINEDO  [address]   [place], [place] [id number]        Dear SUDHIR,      Thank you for selecting [facility] for your healthcare needs.     Our records indicate you are due for the following services:    Diabetic Exam ~ Please bring your glucose meter and/or your blood glucose diary to your appointment.  Non-Fasting Lab    If you had your labs done at another facility or with Direct Access Lab Testinig at [facility], please bring in a copy of the results to your next visit, mail a copy, or drop off a copy of your results to your Healthcare Provider.    You are due for lab work and an office visit; please schedule the lab appointment 1 week before the office visit.  This will assure all results are available to discuss with your provider during your visit.    **It is very helpful if you bring your medication bottles to your appointment.  This assures we have all of your current medications, including strength and dosing information, documented accurately in your medical record.    To schedule an appointment or if you have further questions, please contact your primary clinic:   [facility]  [phone]   formerly [facility]  [phone]             [facility]      [phone]      Powered by [employer]    Sincerely,    Sree Arana M.D.

## 2022-02-16 NOTE — PROGRESS NOTES
Patient:   SUDHIR PINEDO            MRN: [id number]            FIN: [id number]               Age:   67 years     Sex:  Male     :  1950   Associated Diagnoses:   Medicare annual wellness visit, initial; Hypertension; Type 2 diabetes mellitus   Author:   Sree Arana MD      Report Summary   DiagnosisCourse:  Well controlled. Orders  Orders (Selected)   Prescriptions  Prescribed  metFORMIN 500 mg oral tablet: 1 tab(s) ( 500 mg ), PO, BID, # 180 tab(s), 3 Refill(s), Type: Maintenance, Pharmacy: [id number], 1 tab(s) po bid.     Visit Information      Primary Care Provider (PCP):  Sree Arana MD    NPI# [id number]      Date of Service: 2018 09:00 am  Performing Location: [facility]  Encounter#: [id number]      Referring Provider:  Sree Arana MD# [id number]      Date of Service: 2018 09:00 am  Performing Location: [facility]  Encounter#: [id number]      Primary Care Provider (PCP):  Sree Arana MD# [id number]      Referring Provider:  Sree Arana MD# [id number]   Visit type:  Scheduled follow-up.    Source of history:  Self.    History limitation:  None.       Care Providers  Not recorded for selected visit.  Ancillary Services  Not recorded for selected visit.          Current Visit Date:  2018          Chief Complaint   2018 9:11 AM CDT     AWV     Chief complaint and symptoms noted above confirmed with patient.      History of Present Illness             The patient presents for Medicare annual wellness exam.  The patient's general health status is described as unchanged and stable.  The patient's diet is described as balanced.  Exercise occasional.  Associated symptoms consist of denies shortness of breath and denies weight loss.               The patient presents for follow-up evaluation of diabetes.  The quality of the patient's diabetes is described as being unchanged from  previous visit.  Associated symptoms consist of none.  Compliance problems: none.  Glucose results: within target range.  Hemoglobin A1c results: > 6% and within target range.               The patient presents for follow-up evaluation of hypertension.  The quality of hypertension symptom(s) since the patient's last visit is described as being unchanged.  The severity of the hypertension symptom(s) since the last visit is moderate.  Since the patient's last visit, the timing/course of hypertension symptom(s) is constant.  Exacerbating factors consist of none.  Relieving factors consist of medication.        Review of Systems   Constitutional:  Negative.    Ear/Nose/Mouth/Throat:  Hearing is evaluated.    See completed Health History for Review of Systems   Respiratory:  Negative.    Cardiovascular:  Negative.    Gastrointestinal:  Negative.    Psychiatric:  GDS noted.       Health Status   Allergies:    Allergic Reactions (Selected)  No Known Medication Allergies   Medications:  (Selected)   Prescriptions  Prescribed  Cialis 20 mg oral tablet: 1 tab(s) ( 20 mg ), po, daily, # 90 tab(s), 3 Refill(s), Type: Maintenance, Pharmacy: [id number], 1 tab(s) po daily  Indocin 50 mg oral capsule: 1 cap(s) ( 50 mg ), PO, TID, PRN: for gout pain, # 30 cap(s), 0 Refill(s), Type: Maintenance, Pharmacy: [id number], 1 cap(s) po tid,PRN:for gout pain  atorvastatin 40 mg oral tablet: 1 tab(s) ( 40 mg ), PO, Daily, # 90 tab(s), 3 Refill(s), Type: Maintenance, Pharmacy: [facility], Patient coming today to fill as he is out., 1 tab(s) po daily  lisinopril 20 mg oral tablet: 1 tab(s) ( 20 mg ), po, daily, # 90 tab(s), 3 Refill(s), Type: Maintenance, Pharmacy: [id number], 1 tab(s) po daily  metFORMIN 500 mg oral tablet: 1 tab(s) ( 500 mg ), PO, BID, # 180 tab(s), 3 Refill(s), Type: Maintenance, Pharmacy: [id number], 1 tab(s) po bid  Documented Medications  Documented  aspirin 81 mg  oral tablet: 1 tab(s) ( 81 mg ), po, daily, 0 Refill(s), Type: Maintenance   Problem list:    All Problems  Erectile Dysfunction / ICD-9-.84 / Confirmed  Hemorrhoids / ICD-9-.6 / Confirmed  Hyperlipidemia, combined / ICD-9-.4 / Confirmed  Hypertension / SNOMED CT 0124826487 / Confirmed  Glucose intolerance (impaired glucose tolerance) / SNOMED CT 94461233 / Confirmed  Obese / ICD-9-.00 / Probable  Prostatitis, unspecified / SNOMED CT 36940824 / Confirmed  Type 2 diabetes mellitus / SNOMED CT 162468218 / Confirmed   Medicare Assessments      Rm Fall Risk  (06/06/2018 09:11 am)       History of Fall in Last 3 Months Rm:  No     Functional Assessment  (06/06/2018 09:11 am)       Bathing ADL Index:  Independent (2)       Dressing ADL Index:  Independent (2)       Toileting ADL Index:  Independent (2)       Transferring Bed or Chair ADL Index:  Independent (2)       Feeding ADL Index:  Independent (2)     Geriatric Depression Screen  (06/06/2018 09:11 am)       Geriatric Depression Satisfied Life:  Yes       Geriatric Depression Dropped Activities:  No       Geriatric Depression Life Empty:  No       Geriatric Depression Bored:  No       Geriatric Depression Good Spirits:  Yes       Geriatric Depression Afraid Bad Things:  No       Geriatric Depression Feel Happy:  Yes       Geriatric Depression Feel Helpless:  No       Geriatric Depression Prefer to Stay Home:  No       Geriatric Depression Memory Problems:  No       Geriatric Depression Wonderful Be Alive:  Yes       Geriatric Depression Feel Worthless:  No       Geriatric Depression Situation Hopeless:  No       Geriatric Depression Others Better Off:  No       Geriatric Depression Full of Energy:  Yes       Geriatric Depression Total Score:  0     Hearing Screen  (06/06/2018 09:11 am)       Ear, Right Audiogram:  Pass       Ear, Left Audiogram:  Pass     Home Safety Screen  (06/06/2018 09:11 am)       Emergency Numbers Kept/Updated:   Yes       Aware of Smoking Dangers:  Yes       Smoke Alarms/Fire Extinguisher Available:  Yes       Household Members Fire Safety Knowledge:  Yes       Firearms Unloaded and Secure:  Yes       Floor Rugs Removed or Fastened:  Yes       Mats in Bathtub/Shower:  Yes       Stairway Rails or Banisters:  Yes       Outdoor Clutter Safety:  Yes       Indoor Clutter Safety:  Yes       Electrical Cord Safety:  Yes     Vision Screen  (2018 09:11 am)       Eye, Right Visual Acuity Evaluated:  20/25       Eye, Left Visual Acuity Evaluated:  Pass       Vision Test Type:  Snellen       Vision Screen Comments:  B 20/25L 20/13     ADL's and Safety reviewed with patient.      Histories   Past Medical History:    Active  Hemorrhoids (ICD-9-.6)  Prostatitis, unspecified (SNOMED CT 67255364)  Hypertension (SNOMED CT 4673624171)  Erectile Dysfunction (ICD-9-.84)  Glucose intolerance (impaired glucose tolerance) (SNOMED CT 39639178)  Obese (ICD-9-.00)  Hyperlipidemia, combined (ICD-9-.4)   Family History:    Hypertension  Father ()  Mother ()  Heart disease  Father ()     Procedure history:    Extracapsular cataract extraction and insertion of intraocular lens (SNOMED CT 348161185) performed by Karan Odom MD on 2015 at 64 Years.  Comments:  3/5/2015 9:30 AM - Melissa Hamlin.  Colonoscopy (SNOMED CT 847230968) performed by Salvador Clark MD on 2013 at 63 Years.  Comments:  2013 8:55 AM - Virgilio Soria MA  Normal colonoscopy repeat in 10 years  Laparoscopic appendectomy (SNOMED CT 01094451) in the month of 2012 at 62 Years.  Colonoscopy (SNOMED CT 161818175) performed by Tl Rodriguez MD on 2003 at 53 Years.  Comments:  7/10/2012 3:07 PM - Rachell Pozo CMA  Every 10 years  Flexible sigmoidoscopy (SNOMED CT 28526822) performed by Sree Arana MD on 2000 at 49 Years.  Cataract surgery (SNOMED CT 242677383).  Comments:  2015 1:02 PM -  Iman Ozuna  left eye  Retinal detachment (SNOMED CT 79258098).  Comments:  6/9/2015 1:04 PM - Iman Ozuna  Left eye    6/9/2015 1:04 PM - Iman Ozuna  Repaired with laser  Crown of tooth (SNOMED CT 362640695).  Comments:  6/6/2018 9:19 AM - Jadon Alberto CMAcia  04/2018   Social History:        Alcohol Assessment: Current            Beer (12 oz), 1-2 times per week, 1 drinks/episode average.      Tobacco Assessment: Past            Past, Cigarettes, 10 per day.  Started age 18 Years.  Stopped age 42 Years.            Quit in 1991      Substance Abuse Assessment: Denies Substance Abuse            Never      Employment and Education Assessment            Retired            Work/School description: .      Home and Environment Assessment            Marital status: .  Spouse/Partner name: Angela.  Lives with Spouse.  1 children.  Living situation:               Home/Independent.  Smoker in household: No.      Nutrition and Health Assessment            Diet: Low Sugar.  Type of diet: Regular.  Caffeine intake amount: Diet Coke, Coffee 1 cup a day.      Exercise and Physical Activity Assessment: Regular exercise            Exercise duration: 60.  Exercise frequency: 3-4 times/week.  Exercise type: Weight lifting.      Sexual Assessment            Sexually active: Yes.  Sexual orientation: Heterosexual.      Other Assessment             w/1 child and 4 step-children        Physical Examination   Vital Signs   6/6/2018 9:11 AM CDT Temperature Tympanic 97.8 DegF  LOW    Peripheral Pulse Rate 76 bpm    Pulse Site Radial artery    HR Method Manual    Systolic Blood Pressure 124 mmHg    Diastolic Blood Pressure 62 mmHg    Mean Arterial Pressure 83 mmHg    BP Site Right arm    BP Method Manual      Measurements from flowsheet : Measurements   6/6/2018 9:11 AM CDT Height Measured - Standard 68.25 in    Weight Measured - Standard 219.4 lb    BSA 2.19 m2    Body Mass Index 33.11 kg/m2  HI       General:  Alert and oriented, No acute distress.    Eye:  Pupils are equal, round and reactive to light, Normal conjunctiva, Normal conjunctiva.    HENT:  Normocephalic, Tympanic membranes are clear, Oral mucosa is moist.    Neck:  Supple, Non-tender, No carotid bruit, No lymphadenopathy.    Respiratory:  Lungs are clear to auscultation, Respirations are non-labored, Breath sounds are equal.    Cardiovascular:  Normal rate, Regular rhythm, No murmur, No gallop, No edema.    Gastrointestinal:  Soft, Non-tender, Non-distended, Normal bowel sounds.    Musculoskeletal:  Normal range of motion, Normal gait.    Integumentary:  Warm, Dry, Pink, No rash.    Feet:  Normal by visual exam, Normal pulses, Sensation intact, By monofilament exam.    Neurologic:  Alert, Oriented, No focal deficits.    Cognition and Speech:  Oriented, Speech clear and coherent, Functional cognition intact.    Psychiatric:  Cooperative, Appropriate mood & affect, Normal judgment, Patient's GDS and CAGE questionnaire reviewed and discussed with patient. .       Review / Management   Results review:  Lab results   5/29/2018 8:31 AM CDT Sodium Level 141 mmol/L    Potassium Level 4.8 mmol/L    Chloride Level 107 mmol/L    CO2 Level 26 mmol/L    Glucose Level 151 mg/dL  HI    BUN 23 mg/dL    Creatinine 1.23 mg/dL    BUN/Creat Ratio NOT APPLICABLE    eGFR 60 mL/min/1.73m2    eGFR African American 69 mL/min/1.73m2    Calcium Level 9.5 mg/dL    Bili Total 1.1 mg/dL    Alk Phos 76 unit/L    AST/SGOT 15 unit/L    ALT/SGPT 16 unit/L    Protein Total 6.5 gm/dL    Albumin Level 4.2 gm/dL    Globulin 2.3    A/G Ratio 1.8    Hgb A1c 7.1  HI    Cholesterol 126 mg/dL    Non-HDL 88    HDL 38 mg/dL  LOW    Chol/HDL Ratio 3.3    LDL 71    Triglyceride 88 mg/dL    WBC 6.5    RBC 5.05    Hgb 15.5 gm/dL    Hct 45.1 %    MCV 89.3 fL    MCH 30.7 pg    MCHC 34.4 gm/dL    RDW 13.2 %    Platelet 179    MPV 10.0 fL   .       Impression and Plan   Course:  Progressing as  expected.    Plan:  Discussed  preventative services.  Appropriate weight and diet discussed.  Discussed Advance Life Directives.    Preventative services needed::  Preventative services checklist reviewed, updated and copy given to patient.  Please see scanned document.         HIV risk screening: No.    Patient Instructions:          Limitations: Limit caffeine intake, Limit alcohol consumption.         Counseled: Patient, Regarding treatment, Regarding medications, Diet, Activity, Verbalized understanding.    Diagnosis     Hypertension (FLF10-UH I10).     Course:  Progressing as expected.    Plan:       Diet: Low cholesterol, Low fat, Sodium restricted.    Patient Instructions:       Counseled: Patient, Regarding diagnosis, Regarding treatment, Regarding medications, Diet, Activity, Verbalized understanding.    Orders     Orders (Selected)   Prescriptions  Prescribed  lisinopril 20 mg oral tablet: 1 tab(s) ( 20 mg ), po, daily, # 90 tab(s), 3 Refill(s), Type: Maintenance, Pharmacy: [id number], 1 tab(s) po daily.     Diagnosis     Type 2 diabetes mellitus (VOO51-RF E11.9).     Course:  Well controlled.    Orders     Orders (Selected)   Prescriptions  Prescribed  metFORMIN 500 mg oral tablet: 1 tab(s) ( 500 mg ), PO, BID, # 180 tab(s), 3 Refill(s), Type: Maintenance, Pharmacy: [id number], 1 tab(s) po bid.     Diagnosis     Medicare annual wellness visit, initial (IYO10-NR Z00.00).     Total time spent with patient and coordination of care:  _  minutes

## 2022-02-16 NOTE — LETTER
(Inserted Image. Unable to display)     [address], [place] [address] [phone] (phone)  [phone] (fax)  SUDHIR PINEDO    [address]   [place], [place] [id number]        Dear SUDHIR,    Thank you for selecting our practice as your care provider. Below you will find the results and recent diagnostic testings outcomes we have reviewed.       The biopsy results show your lesion was benign.  Please follow routine wound precautions.      Result Name Current Result Previous Result   Tissue Pathology Report   2/13/2019 1/11/2019 6/6/2018       Please contact my practice at the number listed above if you have any questions or concerns.     Sincerely,        Sree Arana MD, FAAFP

## 2022-02-16 NOTE — LETTER
(Inserted Image. Unable to display)   April 03, 2019      SUDHIR PINEDO  [address]   [place], [place] [id number]        Dear SUDHIR,      Thank you for selecting [facility] ([facility]viously [facility]) for your healthcare needs.     Our records indicate you are due for the following services:     Non-Fasting Lab    If you had your labs done at another facility or with [facility] at [facility], please bring in a copy of the results to your next visit, mail a copy, or drop off a copy of your results to your Healthcare Provider.    To schedule an appointment or if you have further questions, please contact your primary clinic:   [facility]          [phone]   [facility]    [phone]             [facility]         [phone]      Powered by [employer]    Sincerely,    Sree Arana M.D.

## 2022-02-16 NOTE — PROGRESS NOTES
Patient:   SUDHIR PINEDO            MRN: [id number]            FIN: [id number]               Age:   70 years     Sex:  Male     :  1950   Associated Diagnoses:   Type 2 diabetes mellitus, controlled, with renal complications; Hypertension   Author:   Sree Arana MD      Report Summary   DiagnosisCourse:  Worsening, Not well controlled. Orders  Orders (Selected)   Outpatient Orders  Future (On Hold)  Hemoglobin A1c* ([facility]): Specimen Type: Blood, *Est. Collection Date: 21 +/- 21 day(s), Future Order  Prescriptions  Prescribed  metFORMIN 500 mg oral tablet: = 2 tab(s) ( 1,000 mg ), PO, BID, # 360 tab(s), 3 Refill(s), Type: Maintenance, Pharmacy: [id number], 2 tab(s) Oral bid,x90 day(s), 68.25, in, 20 9:26:00 CDT, Height Measured, 198, lb, 20 9:26:00 CDT, Weight Measured.  Will try diet and exercise and will recheck A1C in 3 months.     Visit Information      Date of Service: 2021 08:28 am  Performing Location: [facility]  Encounter#: [id number]      Primary Care Provider (PCP):  Sree Arana MD# [id number]      Referring Provider:  Sree Arana MD# [id number]   Visit type:  Scheduled follow-up.    Source of history:  Self.    History limitation:  None.       Chief Complaint   2021 8:37 AM CDT    Chronic disease management.     Chief complaint and symptoms noted above confirmed with patient.      History of Present Illness             The patient presents for follow-up evaluation of diabetes.  The quality of the patient's diabetes is described as being unchanged from previous visit.  Associated symptoms consist of none.  Compliance problems: none.  Glucose results: elevated.  Hemoglobin A1c results: > 6% and elevated.               The patient presents for follow-up evaluation of hypertension.  The quality of hypertension symptom(s) since the patient's last visit is described as being unchanged.  The severity of  the hypertension symptom(s) since the last visit is moderate.  Since the patient's last visit, the timing/course of hypertension symptom(s) is constant.        Review of Systems   Constitutional:  Negative.    Respiratory:  Negative.    Cardiovascular:  Negative.    Gastrointestinal:  Negative.       Health Status   Allergies:    Allergic Reactions (Selected)  No Known Medication Allergies   Medications:  (Selected)   Prescriptions  Prescribed  Cialis 20 mg oral tablet: = 1 tab(s) ( 20 mg ), po, daily, # 30 tab(s), 3 Refill(s), Type: Maintenance, Pharmacy: [facility], 1 tab(s) Oral daily  atorvastatin 40 mg oral tablet: = 1 tab(s) ( 40 mg ), PO, Daily, # 90 tab(s), 3 Refill(s), Type: Maintenance, Pharmacy: [facility], 1 tab(s) Oral daily  indomethacin 50 mg oral capsule: = 1 cap(s) ( 50 mg ), Oral, tid, PRN: for gout pain, # 30 cap(s), 0 Refill(s), Type: Maintenance, Pharmacy: [facility], 1 cap(s) Oral tid,PRN:for gout pain  lisinopril 20 mg oral tablet: = 1 tab(s) ( 20 mg ), po, daily, # 90 tab(s), 3 Refill(s), Type: Maintenance, Pharmacy: [facility], 1 tab(s) Oral daily  metFORMIN 500 mg oral tablet: = 2 tab(s) ( 1,000 mg ), PO, BID, # 360 tab(s), 3 Refill(s), Type: Maintenance, Pharmacy: [facility], 2 tab(s) Oral bid,x90 day(s), 68.25, in, 09/01/20 9:26:00 CDT, Height Measured, 198, lb, 09/01/20 9:26:00 CDT, Weight Measured  Documented Medications  Documented  aspirin 81 mg oral tablet: 1 tab(s) ( 81 mg ), po, daily, 0 Refill(s), Type: Maintenance   Problem list:    All Problems  Hypertension / SNOMED CT 6161049276 / Confirmed  Prostatitis, unspecified / SNOMED CT 54205631 / Confirmed  Type 2 diabetes mellitus / SNOMED CT 892081541 / Confirmed  Type 2 diabetes mellitus, controlled, with renal complications / SNOMED CT 362306619 / Confirmed  Hyperlipidemia, combined / ICD-9-.4 / Confirmed  Obese / ICD-9-.00 / Probable  Hemorrhoids / ICD-9-.6  / Confirmed  Erectile Dysfunction / ICD-9-.84 / Confirmed  Inactive: Glucose intolerance (impaired glucose tolerance) / SNOMED CT 79358315  Inactive: Tobacco abuse / ICD-9-.1      Histories   Past Medical History:    Active  Hemorrhoids (ICD-9-.6)  Prostatitis, unspecified (SNOMED CT 32570625)  Hypertension (SNOMED CT 6821445770)  Erectile Dysfunction (ICD-9-.84)  Obese (ICD-9-.00)  Hyperlipidemia, combined (ICD-9-.4)  Type 2 diabetes mellitus (SNOMED CT 047352775)   Family History:    Hypertension  Father ()  Mother ()  Heart disease  Father ()     Procedure history:    Extracapsular cataract extraction and insertion of intraocular lens (SNOMED CT 077281806) performed by Karan Odom MD on 2015 at 64 Years.  Comments:  3/5/2015 9:30 AM CST - Melissa Hamlin  Left.  Colonoscopy (SNOMED CT 476411727) performed by Salvador Clark MD on 2013 at 63 Years.  Comments:  2013 8:55 AM CDT - Virgilio Soria MA  Normal colonoscopy repeat in 10 years  Laparoscopic appendectomy (SNOMED CT 67964590) in the month of 2012 at 62 Years.  Colonoscopy (SNOMED CT 189220129) performed by Tl Rodriguez MD on 2003 at 53 Years.  Comments:  7/10/2012 3:07 PM CDT - Rachell Pozo CMA  Every 10 years  Flexible sigmoidoscopy (SNOMED CT 05464486) performed by Sree Arana MD on 2000 at 49 Years.  Cataract surgery (SNOMED CT 761039805).  Comments:  2015 1:02 PM CDT - Iman Ozuna  left eye  Retinal detachment (SNOMED CT 01808946).  Comments:  2015 1:04 PM CDT - Iman Ozuna  Left eye    2015 1:04 PM CDT - Iman Ozuna  Repaired with laser  Crown of tooth (SNOMED CT 150621829).  Comments:  2018 9:19 AM CDT - Saritha Alberto CMA  2018   Social History:        Electronic Cigarette/Vaping Assessment            Electronic Cigarette Use: Never.      Alcohol Assessment: Current            Beer (12 oz), 1-2 times per week, 1  drinks/episode average.      Tobacco Assessment: Past            Past, Cigarettes, 10 per day.  Started age 18 Years.  Stopped age 42 Years.            Former smoker, quit more than 30 days ago            Quit in 1991      Substance Abuse Assessment: Denies Substance Abuse            Never      Employment and Education Assessment            Retired            Work/School description: [employer].      Home and Environment Assessment            Marital status: .  Spouse/Partner name: Angela.  Lives with Spouse.  1 children.  Living situation:               Home/Independent.  Smoker in household: No.      Nutrition and Health Assessment            Diet: Low Sugar.  Type of diet: Regular.  Caffeine intake amount: Diet Coke, Coffee 1 cup a day.      Exercise and Physical Activity Assessment: Regular exercise            Exercise duration: 60.  Exercise frequency: 3-4 times/week.  Exercise type: Weight lifting.      Sexual Assessment            Sexually active: Yes.  Sexual orientation: Heterosexual.      Other Assessment             w/1 child and 4 step-children        Physical Examination   Vital Signs   5/28/2021 8:37 AM CDT Peripheral Pulse Rate 60 bpm    Respiratory Rate 16 br/min    Systolic Blood Pressure 118 mmHg    Diastolic Blood Pressure 70 mmHg    Mean Arterial Pressure 86 mmHg    BP Site Right arm    BP Method Manual    Oxygen Saturation 94 %      Measurements from flowsheet : Measurements   5/28/2021 8:37 AM CDT Height/Length Estimated 68.25 in    Weight Measured - Standard 204 lb      General:  Alert and oriented, No acute distress.    Eye:  Normal conjunctiva.    HENT:  Normocephalic, Tympanic membranes are clear.    Neck:  Supple, Non-tender, No carotid bruit.    Respiratory:  Lungs are clear to auscultation, Respirations are non-labored, Breath sounds are equal.    Cardiovascular:  Normal rate, Regular rhythm, No murmur, No gallop.    Gastrointestinal:  Soft, Non-tender, Non-distended,  Normal bowel sounds.    Integumentary:  Warm, Dry, Pink.    Feet:  Normal by visual exam, Normal pulses, Sensation intact, By monofilament exam.    Neurologic:  Alert, Oriented, No focal deficits.    Psychiatric:  Cooperative, Appropriate mood & affect.       Review / Management   Results review:  Lab results   5/19/2021 8:41 AM CDT Sodium Level 138 mmol/L    Potassium Level 4.6 mmol/L    Chloride Level 104 mmol/L    CO2 Level 27 mmol/L    Glucose Level 250 mg/dL  HI    BUN 19 mg/dL    Creatinine 0.97 mg/dL    BUN/Creat Ratio NOT APPLICABLE    eGFR 78 mL/min/1.73m2    eGFR African American 91 mL/min/1.73m2    Calcium Level 9.2 mg/dL    Bili Total 0.8 mg/dL    Alk Phos 68 unit/L    AST/SGOT 13 unit/L    ALT/SGPT 16 unit/L    Protein Total 6.0 gm/dL  LOW    Albumin Level 4.3 gm/dL    Globulin 1.7  LOW    A/G Ratio 2.5    Hgb A1c 10.3  HI    Cholesterol 127 mg/dL    Non-HDL 92    HDL 35 mg/dL  LOW    Chol/HDL Ratio 3.6    LDL 71    Triglyceride 127 mg/dL    U Creatinine 116 mg/dL    U Microalbumin 0.8 mg/dL    Ur Microalb/Creat Ratio 7    WBC 4.9    RBC 5.06    Hgb 15.5 gm/dL    Hct 45.4 %    MCV 89.7 fL    MCH 30.6 pg    MCHC 34.1 gm/dL    RDW 12.8 %    Platelet 118  LOW    MPV 10.8 fL   .       Impression and Plan   Diagnosis     Type 2 diabetes mellitus, controlled, with renal complications (CHZ40-QF E11.29).     Course:  Worsening, Not well controlled.    Orders     Orders (Selected)   Outpatient Orders  Future (On Hold)  Hemoglobin A1c* ([facility]): Specimen Type: Blood, *Est. Collection Date: 08/28/21 +/- 21 day(s), Future Order  Prescriptions  Prescribed  metFORMIN 500 mg oral tablet: = 2 tab(s) ( 1,000 mg ), PO, BID, # 360 tab(s), 3 Refill(s), Type: Maintenance, Pharmacy: [facility], 2 tab(s) Oral bid,x90 day(s), 68.25, in, 09/01/20 9:26:00 CDT, Height Measured, 198, lb, 09/01/20 9:26:00 CDT, Weight Measured.     Will try diet and exercise and will recheck A1C in 3 months.     Diagnosis     Hypertension  (ZDE36-KL I10).     Course:  Progressing as expected, Well controlled.    Plan:       Diet: Low cholesterol, Low fat, Sodium restricted.    Patient Instructions:       Counseled: Patient, Regarding diagnosis, Regarding treatment, Regarding medications, Diet, Activity, Verbalized understanding.    Orders     Orders (Selected)   Prescriptions  Prescribed  atorvastatin 40 mg oral tablet: = 1 tab(s) ( 40 mg ), PO, Daily, # 90 tab(s), 3 Refill(s), Type: Maintenance, Pharmacy: [facility], 1 tab(s) Oral daily, 68.25, in, 09/01/20 9:26:00 CDT, Height Measured, 204, lb, 05/28/21 8:37:00 CDT, Weight Measured  lisinopril 20 mg oral tablet: = 1 tab(s) ( 20 mg ), po, daily, # 90 tab(s), 3 Refill(s), Type: Maintenance, Pharmacy: [facility], 1 tab(s) Oral daily, 68.25, in, 09/01/20 9:26:00 CDT, Height Measured, 204, lb, 05/28/21 8:37:00 CDT, Weight Measured.

## 2022-02-16 NOTE — NURSING NOTE
Hearing and Vision Screening Entered On:  5/31/2019 8:40 AM CDT    Performed On:  5/31/2019 8:39 AM CDT by Emily Rodriguez MA               Hearing and Vision Screening   Audiogram Result Right Ear :   Pass   Audiogram Result Left Ear :   Pass   Emily Rodriguez MA - 5/31/2019 8:39 AM CDT

## 2022-02-16 NOTE — LETTER
(Inserted Image. Unable to display)     [address] [place] [address] [phone] (phone)  [phone] (fax)  SUDHIR PINEDO    [address]   [place], [place] [id number]        Dear SUDHIR,    Thank you for selecting our practice as your care provider. Below you will find the results and recent diagnostic testings outcomes we have reviewed.        These are stable, please keep scheduled follow up appointments.      Result Name Current Result Previous Result Reference Range   Sodium Level (mmol/L)  139 9/12/2019  140 5/23/2019   141 5/29/2018 135 - 146   Potassium Level (mmol/L)  4.9 9/12/2019  5.0 5/23/2019   4.8 5/29/2018 3.5 - 5.3   Chloride Level (mmol/L)  105 9/12/2019  105 5/23/2019   107 5/29/2018 98 - 110   CO2 Level (mmol/L)  28 9/12/2019  28 5/23/2019   26 5/29/2018 20 - 32   Glucose Level (mg/dL) ((H)) 149 9/12/2019 ((H)) 160 5/23/2019  ((H)) 151 5/29/2018 65 - 99   BUN (mg/dL)  20 9/12/2019  24 5/23/2019   23 5/29/2018 7 - 25   Creatinine Level (mg/dL)  1.13 9/12/2019 ((H)) 1.47 5/23/2019   1.23 5/29/2018 0.70 - 1.18   eGFR (mL/min/1.73m2)  65 9/12/2019 ((L)) 48 5/23/2019   60 5/29/2018 > OR = 60 -    Calcium Level (mg/dL)  9.2 9/12/2019  9.3 5/23/2019   9.5 5/29/2018 8.6 - 10.3   Hgb A1c ((H)) 7.1 9/12/2019 ((H)) 6.8 4/17/2019  ((H)) 8.1 12/19/2018  ((H)) 7.1 5/29/2018  - <5.7       Please contact my practice at the number listed above if you have any questions or concerns.     Sincerely,        Sree Arana MD, FAAFP

## 2022-02-16 NOTE — NURSING NOTE
Comprehensive Intake Entered On:  5/28/2021 8:43 AM CDT    Performed On:  5/28/2021 8:37 AM CDT by iKm Goddard CMA               Summary   Chief Complaint :   Chronic disease management.   Menstrual Status :   N/A   Weight Measured :   204 lb(Converted to: 204 lb 0 oz, 92.533 kg)    Height/Length Estimated :   68.25 in(Converted to: 5 ft 8 in, 173.35 cm)    Systolic Blood Pressure :   118 mmHg   Diastolic Blood Pressure :   70 mmHg   Mean Arterial Pressure :   86 mmHg   Peripheral Pulse Rate :   60 bpm   BP Site :   Right arm   BP Method :   Manual   Respiratory Rate :   16 br/min   Oxygen Saturation :   94 %   Kim Goddard CMA - 5/28/2021 8:37 AM CDT   Health Status   Allergies Verified? :   Yes   Medication History Verified? :   Yes   Immunizations Current :   Yes   Medical History Verified? :   Yes   Pre-Visit Planning Status :   Completed   Tobacco Use? :   Former smoker   Kim Goddard CMA - 5/28/2021 8:37 AM CDT   Consents   Consent for Immunization Exchange :   Consent Granted   Consent for Immunizations to Providers :   Consent Granted   Kim Goddard CMA - 5/28/2021 8:37 AM CDT   Meds / Allergies   (As Of: 5/28/2021 8:43:57 AM CDT)   Allergies (Active)   No Known Medication Allergies  Estimated Onset Date:   Unspecified ; Created By:   Rosie Cramer CMA; Reaction Status:   Active ; Category:   Drug ; Substance:   No Known Medication Allergies ; Type:   Allergy ; Updated By:   Rosie Cramer CMA; Reviewed Date:   5/28/2021 8:40 AM CDT        Medication List   (As Of: 5/28/2021 8:43:57 AM CDT)   Prescription/Discharge Order    atorvastatin  :   atorvastatin ; Status:   Prescribed ; Ordered As Mnemonic:   atorvastatin 40 mg oral tablet ; Simple Display Line:   40 mg, 1 tab(s), PO, Daily, 90 tab(s), 3 Refill(s) ; Ordering Provider:   Sree Arana MD; Catalog Code:   atorvastatin ; Order Dt/Tm:   5/19/2020 8:51:26 AM CDT          indomethacin  :   indomethacin ; Status:   Prescribed ; Ordered As  Mnemonic:   indomethacin 50 mg oral capsule ; Simple Display Line:   50 mg, 1 cap(s), Oral, tid, PRN: for gout pain, 30 cap(s), 0 Refill(s) ; Ordering Provider:   Sree Arana MD; Catalog Code:   indomethacin ; Order Dt/Tm:   7/9/2019 9:35:12 AM CDT          lisinopril  :   lisinopril ; Status:   Prescribed ; Ordered As Mnemonic:   lisinopril 20 mg oral tablet ; Simple Display Line:   20 mg, 1 tab(s), po, daily, 90 tab(s), 3 Refill(s) ; Ordering Provider:   Sree Arana MD; Catalog Code:   lisinopril ; Order Dt/Tm:   5/19/2020 8:51:26 AM CDT          metFORMIN  :   metFORMIN ; Status:   Prescribed ; Ordered As Mnemonic:   metFORMIN 500 mg oral tablet ; Simple Display Line:   1,000 mg, 2 tab(s), PO, BID, for 90 day(s), 360 tab(s), 3 Refill(s) ; Ordering Provider:   Sree Arana MD; Catalog Code:   metFORMIN ; Order Dt/Tm:   9/1/2020 9:34:09 AM CDT          tadalafil  :   tadalafil ; Status:   Prescribed ; Ordered As Mnemonic:   Cialis 20 mg oral tablet ; Simple Display Line:   20 mg, 1 tab(s), po, daily, 30 tab(s), 3 Refill(s) ; Ordering Provider:   Sree Arana MD; Catalog Code:   tadalafil ; Order Dt/Tm:   5/19/2020 8:51:27 AM CDT            Home Meds    aspirin  :   aspirin ; Status:   Documented ; Ordered As Mnemonic:   aspirin 81 mg oral tablet ; Simple Display Line:   81 mg, 1 tab(s), po, daily, 0 Refill(s) ; Catalog Code:   aspirin ; Order Dt/Tm:   6/7/2017 9:21:57 AM CDT            ID Risk Screen   Recent Travel History :   No recent travel   Family Member Travel History :   No recent travel   Other Exposure to Infectious Disease :   Unknown   COVID-19 Testing Status :   No positive COVID-19 test   Kim Goddard CMA - 5/28/2021 8:37 AM CDT   Social History   Social History   (As Of: 5/28/2021 8:43:58 AM CDT)   Alcohol:  Current      Beer (12 oz), 1-2 times per week, 1 drinks/episode average.   (Last Updated: 8/29/2013 1:55:05 PM CDT by Melissa Hamlin)          Tobacco:   Past      Quit in 1991   (Last Updated: 8/16/2010 11:01:21 AM CDT by Melissa Hamlin)   Past, Cigarettes, 10 per day.  Started age 18 Years.  Stopped age 42 Years.   (Last Updated: 3/24/2011 11:05:48 AM CDT by Rachell Pozo CMA)   Former smoker, quit more than 30 days ago   (Last Updated: 5/28/2021 8:37:45 AM CDT by Kim Goddard CMA)          Electronic Cigarette/Vaping:        Electronic Cigarette Use: Never.   (Last Updated: 5/28/2021 8:37:49 AM CDT by Kim Goddard CMA)          Substance Abuse:  Denies Substance Abuse      Never   (Last Updated: 8/29/2013 1:54:19 PM CDT by Melisas Hamlin)          Employment/School:        Work/School description: [employer].   (Last Updated: 8/16/2010 11:03:26 AM CDT by Melissa Hamlin)   Retired   (Last Updated: 3/24/2011 11:06:05 AM CDT by Rachell Pozo CMA)          Home/Environment:        Marital status: .  Spouse/Partner name: Angela.  Lives with Spouse.  1 children.  Living situation: Home/Independent.  Smoker in household: No.   (Last Updated: 7/10/2012 3:09:43 PM CDT by Rachell Pozo CMA)          Nutrition/Health:        Diet: Low Sugar.  Type of diet: Regular.  Caffeine intake amount: Diet Coke, Coffee 1 cup a day.   (Last Updated: 7/10/2012 3:10:27 PM CDT by Rachell Pozo CMA)          Exercise:  Regular exercise      Exercise duration: 60.  Exercise frequency: 3-4 times/week.  Exercise type: Weight lifting.   (Last Updated: 3/24/2011 11:06:51 AM CDT by Rachell Pozo CMA)          Sexual:        Sexually active: Yes.  Sexual orientation: Heterosexual.   (Last Updated: 8/29/2013 1:54:46 PM CDT by Melissa Hamlin)          Other:         w/1 child and 4 step-children   (Last Updated: 3/24/2011 11:07:14 AM CDT by Rachell Pozo CMA)

## 2022-02-16 NOTE — NURSING NOTE
Comprehensive Intake Entered On:  2/13/2019 11:09 AM CST    Performed On:  2/13/2019 11:06 AM CST by Emily Rodriguez MA               Summary   Chief Complaint :   Remove spot on head    Menstrual Status :   N/A   Weight Measured :   218 lb(Converted to: 218 lb 0 oz, 98.88 kg)    Height Measured :   68.25 in(Converted to: 5 ft 8 in, 173.35 cm)    Body Mass Index :   32.9 kg/m2 (HI)    Body Surface Area :   2.18 m2   Systolic Blood Pressure :   126 mmHg   Diastolic Blood Pressure :   72 mmHg   Mean Arterial Pressure :   90 mmHg   Peripheral Pulse Rate :   64 bpm   BP Site :   Left arm   Pulse Site :   Radial artery   BP Method :   Manual   HR Method :   Manual   Emily Rodriguez MA - 2/13/2019 11:06 AM CST   Health Status   Allergies Verified? :   Yes   Medication History Verified? :   Yes   Immunizations Current :   Yes   Medical History Verified? :   Yes   Pre-Visit Planning Status :   Completed   Tobacco Use? :   Former smoker   Emily Rodriguez MA - 2/13/2019 11:06 AM CST   Consents   Consent for Immunization Exchange :   Consent Granted   Consent for Immunizations to Providers :   Consent Granted   Emily Rodriguez MA - 2/13/2019 11:06 AM CST   Meds / Allergies   (As Of: 2/13/2019 11:09:13 AM CST)   Allergies (Active)   No Known Medication Allergies  Estimated Onset Date:   Unspecified ; Created By:   Rosie Cramer CMA; Reaction Status:   Active ; Category:   Drug ; Substance:   No Known Medication Allergies ; Type:   Allergy ; Updated By:   Rosie Cramer CMA; Reviewed Date:   2/13/2019 11:07 AM CST        Medication List   (As Of: 2/13/2019 11:09:13 AM CST)   Prescription/Discharge Order    tadalafil  :   tadalafil ; Status:   Prescribed ; Ordered As Mnemonic:   Cialis 20 mg oral tablet ; Simple Display Line:   20 mg, 1 tab(s), po, daily, 90 tab(s), 3 Refill(s) ; Ordering Provider:   Sree Arana MD; Catalog Code:   tadalafil ; Order Dt/Tm:   6/6/2018 9:29:44 AM          metFORMIN  :   metFORMIN ; Status:    Prescribed ; Ordered As Mnemonic:   metFORMIN 500 mg oral tablet ; Simple Display Line:   500 mg, 1 tab(s), PO, BID, 180 tab(s), 3 Refill(s) ; Ordering Provider:   Sree Arana MD; Catalog Code:   metFORMIN ; Order Dt/Tm:   6/6/2018 9:29:43 AM          lisinopril  :   lisinopril ; Status:   Prescribed ; Ordered As Mnemonic:   lisinopril 20 mg oral tablet ; Simple Display Line:   20 mg, 1 tab(s), po, daily, 90 tab(s), 3 Refill(s) ; Ordering Provider:   Sree Aarna MD; Catalog Code:   lisinopril ; Order Dt/Tm:   6/6/2018 9:29:41 AM          atorvastatin  :   atorvastatin ; Status:   Prescribed ; Ordered As Mnemonic:   atorvastatin 40 mg oral tablet ; Simple Display Line:   40 mg, 1 tab(s), PO, Daily, 90 tab(s), 3 Refill(s) ; Ordering Provider:   Sree Arana MD; Catalog Code:   atorvastatin ; Order Dt/Tm:   6/6/2018 9:29:38 AM          indomethacin  :   indomethacin ; Status:   Prescribed ; Ordered As Mnemonic:   Indocin 50 mg oral capsule ; Simple Display Line:   50 mg, 1 cap(s), PO, TID, PRN: for gout pain, 30 cap(s), 0 Refill(s) ; Ordering Provider:   Sree Arana MD; Catalog Code:   indomethacin ; Order Dt/Tm:   11/2/2016 2:51:40 PM            Home Meds    aspirin  :   aspirin ; Status:   Documented ; Ordered As Mnemonic:   aspirin 81 mg oral tablet ; Simple Display Line:   81 mg, 1 tab(s), po, daily, 0 Refill(s) ; Catalog Code:   aspirin ; Order Dt/Tm:   6/7/2017 9:21:57 AM

## 2022-02-16 NOTE — PROGRESS NOTES
Patient:   SUDHIR PINEDO            MRN: [id number]            FIN: [id number]               Age:   68 years     Sex:  Male     :  1950   Associated Diagnoses:   Atypical mole   Author:   Sree Arana MD      Visit Information      Date of Service: 2019 10:50 am  Performing Location: [facility]  Encounter#: [id number]      Primary Care Provider (PCP):  Sree Arana MD    NPI# [id number]      Referring Provider:  Sree Arana MD# [id number]      Procedure   Biopsy procedure   Date/ Time:  2019 11:50:00 AM.     Confirmed: patient, procedure, side, site, safety procedures followed.     Performed by: self.     Informed consent: signed by patient.     Indication: abnormal physical findings, lesion, enlargement.     Preparation and technique: skin prepped (in usual sterile fashion, with alcohol), local anesthesia 1% lidocaine without epinephrine, technique shave biopsy, hemostasis achieved using medication:  Chloral Hydrate.     Site #  1: left, scalp, size and depth (length  1  cm, width  1  cm, superficial), specimen sent to pathology.     Completion: 1  lesions biopsied, estimated blood loss minimal.     Procedure tolerated: well.     No Complications.        Impression and Plan   Diagnosis     Atypical mole (TQW40-AI L81.4).

## 2022-02-16 NOTE — LETTER
(Inserted Image. Unable to display)   [address] [place]  [address]  [phone]    August 26, 2020      SUDHIR PINEDO      [address]   [place], [place] [id number]        Dear SUDHIR,    Thank you for selecting [facility] for your healthcare needs. Below you will find the result of your recent test(s) done at our clinic.     Your A1c is improved, but still elevated. Your PSA is normal. Please discuss with Dr. Arana at your upcoming visit.      Result Name Current Result Previous Result Reference Range   Hgb A1c ((H)) 9.7 8/25/2020 ((H)) 12.7 5/12/2020  - <5.7   PSA (ng/mL)  1.1 8/25/2020   - < OR = 4.0       Please contact my practice at [phone] if you have any questions or concerns.      Sincerely,        Sree Arana MD ,   Charmaine oLckwood MD,   Germain Cortes PA-C

## 2022-02-16 NOTE — NURSING NOTE
Comprehensive Intake Entered On:  5/12/2020 11:09 AM CDT    Performed On:  5/12/2020 11:08 AM CDT by Schoenike , Andrea               Summary   Chief Complaint :   Lab draw and vitals   Menstrual Status :   N/A   Weight Measured :   201.4 lb(Converted to: 201 lb 6 oz, 91.35 kg)    Systolic Blood Pressure :   125 mmHg   Diastolic Blood Pressure :   79 mmHg   Mean Arterial Pressure :   94 mmHg   Peripheral Pulse Rate :   68 bpm   BP Site :   Right arm   Pulse Site :   Radial artery   BP Method :   Electronic   HR Method :   Electronic   Oxygen Saturation :   98 %   Schoenike , Andrea - 5/12/2020 11:08 AM CDT   ID Risk Screen   Recent Travel History :   No recent travel   Family Member Travel History :   No recent travel   Other Exposure to Infectious Disease :   Unknown   Schoenike , Andrea - 5/12/2020 11:08 AM CDT

## 2022-02-16 NOTE — LETTER
(Inserted Image. Unable to display)     [address] [place] [address]  [phone] (phone)    January 20, 2019    SUDHIR PINEDO  [address]   [place], [place] [id number]    Dear SUDHIR,      Thank you for selecting our practice as your care provider. Below you will find the results and recent diagnostic testings outcomes we have reviewed.     These results are show a pigmented precancerous lesion.  Please look for any signs of recurrence. (This was NOT a melanoma)          Please contact my practice at the number listed above if you have any questions or concerns.     Sincerely,    Yasmeen BUSTILLOS, Sree

## 2022-02-16 NOTE — LETTER
(Inserted Image. Unable to display)     [address] [place], [place] [address] [phone] (phone)  [phone] (fax)  SUDHIR PINEDO    [address]   [place], [place] [id number]        Dear SUDHIR,    Thank you for selecting our practice as your care provider. Below you will find the results and recent diagnostic testings outcomes we have reviewed.        These results look good, keep up the good work!  Please keep scheduled follow up appointments.        Result Name Current Result Previous Result Reference Range   Hgb A1c ((H)) 6.8 4/17/2019 ((H)) 8.1 12/19/2018  ((H)) 7.1 5/29/2018  ((H)) 6.3 9/6/2017  - <5.7       Please contact my practice at the number listed above if you have any questions or concerns.     Sincerely,        Sree Arana MD, FAAFP

## 2022-02-16 NOTE — PROGRESS NOTES
Patient:   SUDHIR PINEDO            MRN: [id number]            FIN: [id number]               Age:   70 years     Sex:  Male     :  1950   Associated Diagnoses:   Atypical mole   Author:   Sree Arana MD      Visit Information      Date of Service: 2020 09:23 am  Performing Location: [facility]  Encounter#: [id number]      Primary Care Provider (PCP):  Sree Arana MD    NPI# [id number]      Referring Provider:  Magdy Mims MD    NPI# [id number]      Procedure   Biopsy procedure   Date/ Time:  2020 10:00:00 AM.     Confirmed: patient, procedure, side, site, safety procedures followed.     Performed by: self.     Informed consent: signed by patient.     Indication: abnormal physical findings, suspected malignancy, lesion, change in appearance.     Preparation and technique: skin prepped (in usual sterile fashion, with alcohol), local anesthesia 1% lidocaine without epinephrine, technique punch biopsy.     Site #  1: right, leg, size and depth (length  0.6  cm, width  0.6  cm, involving subcutaneous tissue), specimen sent to pathology.     Completion: 1  lesions biopsied.     Procedure tolerated: well.     No Complications.        Impression and Plan   Diagnosis     Atypical mole ([id number] L81.4).

## 2022-02-16 NOTE — TELEPHONE ENCOUNTER
---------------------  From: Tab DENTON Thelma   Sent: 2019 9:37:27 AM CDT  Subject: gout refill     Phone Message    PCP:   CHT      Time of Call:  842       Person Calling:  pt  Phone number:  [phone]    Returned call at: 0932    Note:   Pt leaving Saturday for vacation. Flare of gout starting. Old indomethacin rx has  and would like new one sent to [facility]. Refill sent, pt notified and will be seen if sx do not clear.     Last office visit and reason:  19 chronic disease f/u CHT

## 2022-02-16 NOTE — PROGRESS NOTES
Patient:   SUDHIR PINEDO            MRN: [id number]            FIN: [id number]               Age:   66 years     Sex:  Male     :  1950   Associated Diagnoses:   Impacted cerumen   Author:   Yasmeen BUSTILLOS, Sree      Procedure   Ear foreign body removal procedure   Date:  2017.     Confirmed: patient.     Performed by: self.     Informed consent: Verbally obtained.     Indication: hearing disturbance.     Location: left ear, right ear.     Preparation and technique: positioned sitting upright, method including (cerumen loop, curette, irrigation with warm tap water, otologic syringe).     Results: foreign body removal complete.     Procedure tolerated: well.     No Complications.        Impression and Plan   Diagnosis     Impacted cerumen (LEJ35-SG H61.23).     Orders     F/U  if not improving, sooner if getting worse.

## 2022-02-16 NOTE — NURSING NOTE
Comprehensive Intake Entered On:  8/25/2020 11:07 AM CDT    Performed On:  8/25/2020 11:06 AM CDT by Schoenike , Andrea               Summary   Chief Complaint :   Lab draw and BP   Menstrual Status :   N/A   Systolic Blood Pressure :   123 mmHg   Diastolic Blood Pressure :   72 mmHg   Mean Arterial Pressure :   89 mmHg   Peripheral Pulse Rate :   78 bpm   BP Site :   Left arm   Pulse Site :   Radial artery   BP Method :   Electronic   HR Method :   Electronic   Schoenike , Andrea - 8/25/2020 11:06 AM CDT   ID Risk Screen   Recent Travel History :   No recent travel   Family Member Travel History :   No recent travel   Other Exposure to Infectious Disease :   Unknown   Schoenike , Andrea - 8/25/2020 11:06 AM CDT

## 2022-02-16 NOTE — LETTER
(Inserted Image. Unable to display)     [address] [place], [place] [id number] [phone] (phone)  [phone] (fax)  SUDHIR PINEDO    [address]   [place], [place] [id number]        Dear SUDHIR,    Thank you for selecting our practice as your care provider. Below you will find the results and recent diagnostic testings outcomes we have reviewed.        The biopsy results show your lesion was benign.  Please follow routine wound precautions and return for suture removal as scheduled.      Result Name Current Result Previous Result   Tissue Pathology Report   9/1/2020 2/13/2019 1/11/2019       Please contact my practice at the number listed above if you have any questions or concerns.     Sincerely,        Sree Arana MD, FAAFP

## 2022-02-16 NOTE — PROGRESS NOTES
"   Patient:   NABOR LIN            MRN: [id number]            FIN: [id number]               Age:   66 years     Sex:  Male     :  1950   Associated Diagnoses:   None   Author:   Sree Arana MD      Subjective   Chief complaint 3/3/2017 11:18 AM CST    c/o sinus congestion x 6 wks.       Mr. Nabor Lin is a 66 year old gentleman with history of HTN and hyperlipidemia who presents with a two month history of intermittent sinus congestion and post-nasal drip.  He reports a gastrointestinal illness around [place] time involving fever and vomitting.  Beginning immediately after that illness, he developed sinus congestion and post-nasal drip with dry cough.  He reports he and his wife \"passing this back and forth\" multiple times throughout the last two months with interval periods of sypmtom improvement over this time. This current episode started 3-4 days ago and is similar to the rest-- sinus congestion and drainage.  He has experienced occasional sore throat with the drainage but denies sore throat currently.  Denies fever/chills or tooth pain. He has been treating his symptoms with Nyquil most nights and occasionally  in the mornings.  No antibiotics have been given for this up to this point. He feels his symptoms are consistent with what he has experienced during previous sinus infections.       Health Status   Allergies:    Allergic Reactions (Selected)  No Known Medication Allergies   Medications:  (Selected)   Prescriptions  Prescribed  Augmentin 875 mg oral tablet: 1 tab(s), PO, q12hr, # 20 tab(s), 0 Refill(s), Type: Maintenance, Pharmacy: [id number], 1 tab(s) po q12 hrs,x10 day(s)  Indocin 50 mg oral capsule: 1 cap(s) ( 50 mg ), PO, TID, PRN: for gout pain, # 30 cap(s), 0 Refill(s), Type: Maintenance, Pharmacy: [id number], 1 cap(s) po tid,PRN:for gout pain  atorvastatin 40 mg oral tablet: 1 tab(s) ( 40 mg ), PO, Daily, # 90 tab(s), 3 Refill(s), Type: " Maintenance, [id number], 1 tab(s) po daily  lisinopril 20 mg oral tablet: 1 tab(s) ( 20 mg ), po, daily, # 90 tab(s), 3 Refill(s), Type: Maintenance, Pharmacy: [id number], 1 tab(s) po daily  Documented Medications  Documented  aspirin 325 mg oral tablet: 1 tab(s) ( 325 mg ), po, daily, 0 Refill(s)   Problem list:    All Problems (Selected)  Erectile Dysfunction / ICD-9-.84 / Confirmed  Hemorrhoids / ICD-9-.6 / Confirmed  Hyperlipidemia, combined / ICD-9-.4 / Confirmed  Hypertension / SNOMED CT 2182075307 / Confirmed  Glucose intolerance (impaired glucose tolerance) / SNOMED CT 75227797 / Confirmed  Obese / ICD-9-.00 / Probable  Prostatitis, unspecified / SNOMED CT 40450982 / Confirmed      Objective   Vital Signs   3/3/2017 11:18 AM CST Temperature Temporal 97.6 DegF    Peripheral Pulse Rate 70 bpm    Pulse Site Radial artery    Systolic Blood Pressure 132 mmHg    Diastolic Blood Pressure 74 mmHg    Mean Arterial Pressure 93 mmHg    BP Site Right arm    BP Method Manual    Oxygen Saturation 96 %      Measurements from flowsheet : Measurements   3/3/2017 11:18 AM CST Height Measured - Standard 68.5 in    Weight Measured - Standard 218.2 lb    BSA 2.18 m2    Body Mass Index 32.69 kg/m2      General: Alert, oriented male in no acute distress.   HEENT: Conjunctivae non-injected. Oropharynx clear.  No nasal discharge noted. No tenderness to percussion of the sinuses.    CV: Regular rate and rhythm, no murmurs, rubs, or gallops.   Pulm: Lungs clear to auscultation bilaterally.       Results Review   No new results today.       Impression and Plan     1. Sinus congestion x 2 months, possible bacterial sinusitis  -Will treat with Augmentin 875 mg PO q12hr x 10 days.   -Patient instructed to complete the entire course of the antibiotic and to take it with food.     Rosie Riley, Medical Student     Patient seen and examined and treated by myself. Note prepared by  student and reviewed.Patient seen and examined and treated by myself. Note prepared by student and reviewed.

## 2022-02-16 NOTE — NURSING NOTE
Depression Screening Entered On:  5/28/2021 9:12 AM CDT    Performed On:  5/28/2021 9:12 AM CDT by Kim Goddard CMA               Depression Screening   Little Interest - Pleasure in Activities :   Not at all   Feeling Down, Depressed, Hopeless :   Not at all   Initial Depression Screen Score :   0 Score   Poor Appetite or Overeating :   Not at all   Trouble Falling or Staying Asleep :   Not at all   Feeling Tired or Little Energy :   Not at all   Feeling Bad About Yourself :   Not at all   Trouble Concentrating :   Not at all   Moving or Speaking Slowly :   Not at all   Thoughts Better Off Dead or Hurting Self :   Not at all   Difficulty at Work, Home, Getting Along :   Not difficult at all   Detailed Depression Screen Score :   0    Total Depression Screen Score :   0    Kim Goddard CMA - 5/28/2021 9:12 AM CDT

## 2022-02-16 NOTE — LETTER
(Inserted Image. Unable to display)   [address] [place] [address] [phone] (phone)  [phone] (fax)  SUDHIR PINEDO    [address] [place] [id number]        Dear SUDHIR,    Thank you for selecting our practice as your care provider. Below you will find the results and recent diagnostic testings outcomes we have reviewed.        Please make an appointment to discuss these results.      Result Name Current Result Previous Result Reference Range   Sodium Level (mmol/L)  138 5/19/2021  136 5/12/2020   139 9/12/2019   140 5/23/2019 135 - 146   Potassium Level (mmol/L)  4.6 5/19/2021  4.8 5/12/2020   4.9 9/12/2019   5.0 5/23/2019 3.5 - 5.3   Chloride Level (mmol/L)  104 5/19/2021  101 5/12/2020   105 9/12/2019   105 5/23/2019 98 - 110   CO2 Level (mmol/L)  27 5/19/2021  27 5/12/2020   28 9/12/2019   28 5/23/2019 20 - 32   Glucose Level (mg/dL) ((H)) 250 5/19/2021 ((H)) 337 5/12/2020  ((H)) 149 9/12/2019  ((H)) 160 5/23/2019 65 - 99   BUN (mg/dL)  19 5/19/2021  21 5/12/2020   20 9/12/2019   24 5/23/2019 7 - 25   Creatinine Level (mg/dL)  0.97 5/19/2021  1.07 5/12/2020   1.13 9/12/2019  ((H)) 1.47 5/23/2019 0.70 - 1.18   eGFR (mL/min/1.73m2)  78 5/19/2021  70 5/12/2020   65 9/12/2019  ((L)) 48 5/23/2019 > OR = 60 -    Calcium Level (mg/dL)  9.2 5/19/2021  9.6 5/12/2020   9.2 9/12/2019   9.3 5/23/2019 8.6 - 10.3   Bilirubin Total (mg/dL)  0.8 5/19/2021  0.9 5/12/2020   0.9 5/23/2019 0.2 - 1.2   Alkaline Phosphatase (unit/L)  68 5/19/2021  78 5/12/2020   66 5/23/2019 35 - 144   AST/SGOT (unit/L)  13 5/19/2021  15 5/12/2020   15 5/23/2019 10 - 35   ALT/SGPT (unit/L)  16 5/19/2021  16 5/12/2020   17 5/23/2019 9 - 46   Protein Total (gm/dL) ((L)) 6.0 5/19/2021  6.3 5/12/2020   6.1 5/23/2019 6.1 - 8.1   Albumin Level (gm/dL)  4.3 5/19/2021  4.4 5/12/2020   4.2 5/23/2019 3.6 - 5.1   Globulin ((L)) 1.7 5/19/2021  1.9 5/12/2020   1.9 5/23/2019 1.9 - 3.7   A/G Ratio  2.5 5/19/2021  2.3 5/12/2020   2.2  5/23/2019 1.0 - 2.5   Hgb A1c ((H)) 10.3 5/19/2021 ((H)) 7.7 12/16/2020  ((H)) 9.7 8/25/2020  ((H)) 12.7 5/12/2020  - <5.7   Cholesterol (mg/dL)  127 5/19/2021  128 5/12/2020   130 5/23/2019  - <200   Non-HDL Cholesterol  92 5/19/2021  91 5/12/2020   91 5/23/2019  - <130   HDL (mg/dL) ((L)) 35 5/19/2021 ((L)) 37 5/12/2020  ((L)) 39 5/23/2019 > OR = 40 -    Cholesterol/HDL Ratio  3.6 5/19/2021  3.5 5/12/2020   3.3 5/23/2019  - <5.0   LDL  71 5/19/2021  67 5/12/2020   72 5/23/2019    Triglyceride (mg/dL)  127 5/19/2021 ((H)) 165 5/12/2020   111 5/23/2019  - <150   U Creatinine (mg/dL)  116 5/19/2021  20 - 320   U Microalbumin (mg/dL)  0.8 5/19/2021  0.3 5/12/2020   0.7 5/23/2019 See Note: -    Ur Microalbumin/Creatinine Ratio  7 5/19/2021   - <30   WBC  4.9 5/19/2021  6.6 5/12/2020   6.3 5/23/2019 3.8 - 10.8   RBC  5.06 5/19/2021  4.99 5/12/2020   5.08 5/23/2019 4.20 - 5.80   Hgb (gm/dL)  15.5 5/19/2021  16.0 5/12/2020   15.9 5/23/2019 13.2 - 17.1   Hct (%)  45.4 5/19/2021  44.2 5/12/2020   45.4 5/23/2019 38.5 - 50.0   MCV (fL)  89.7 5/19/2021  88.6 5/12/2020   89.4 5/23/2019 80.0 - 100.0   MCH (pg)  30.6 5/19/2021  32.1 5/12/2020   31.3 5/23/2019 27.0 - 33.0   MCHC (gm/dL)  34.1 5/19/2021 ((H)) 36.2 5/12/2020   35.0 5/23/2019 32.0 - 36.0   RDW (%)  12.8 5/19/2021  12.8 5/12/2020   12.8 5/23/2019 11.0 - 15.0   Platelet ((L)) 118 5/19/2021  180 5/12/2020   186 5/23/2019 140 - 400   MPV (fL)  10.8 5/19/2021  11.1 5/12/2020   10.4 5/23/2019 7.5 - 12.5       Please contact my practice at the number listed above if you have any questions or concerns.     Sincerely,        Sree Arana MD, FAAFP      What do your labs mean? Below is a glossary of commonly ordered labs:  LDL - Bad Cholesterol HDL - Good Cholesterol AST/ALT - Liver Function  PSA -  Prostate  TSH - Thyroid  Hgb (Hemoglobin) - Red Blood Cells  Cr/Creatinine/Microalbumin/ BUN/eGFR - Kidney Function HgbA1c - Diabetes Test

## 2022-02-16 NOTE — PROGRESS NOTES
Patient:   SUDHIR PINEDO            MRN: [id number]            FIN: [id number]               Age:   67 years     Sex:  Male     :  1950   Associated Diagnoses:   Impacted cerumen   Author:   Sree Arana MD      Procedure   Ear foreign body removal procedure   Time.     Confirmed: patient.     Performed by: self.     Informed consent: Verbally obtained.     Indication: hearing disturbance.     Location: left ear, right ear.     Preparation and technique: positioned sitting upright, method including (cerumen loop, curette, irrigation with warm tap water, otologic syringe).     Results: foreign body removal complete.     Procedure tolerated: well.     No Complications.        Impression and Plan   Diagnosis     Impacted cerumen ([id number] H61.23).     Orders     F/U  if not improving, sooner if getting worse.

## 2022-02-16 NOTE — NURSING NOTE
Comprehensive Intake Entered On:  5/31/2019 8:39 AM CDT    Performed On:  5/31/2019 8:34 AM CDT by Emily Rodriguez MA               Summary   Chief Complaint :   HTN med check   Menstrual Status :   N/A   Weight Measured :   212.4 lb(Converted to: 212 lb 6 oz, 96.34 kg)    Height Measured :   68.25 in(Converted to: 5 ft 8 in, 173.35 cm)    Body Mass Index :   32.06 kg/m2 (HI)    Body Surface Area :   2.15 m2   Systolic Blood Pressure :   122 mmHg   Diastolic Blood Pressure :   74 mmHg   Mean Arterial Pressure :   90 mmHg   Peripheral Pulse Rate :   64 bpm   BP Site :   Left arm   Pulse Site :   Radial artery   BP Method :   Manual   HR Method :   Manual   Temperature Tympanic :   97.7 DegF(Converted to: 36.5 DegC)  (LOW)    Emliy Rodriguez MA - 5/31/2019 8:34 AM CDT   Health Status   Allergies Verified? :   Yes   Medication History Verified? :   Yes   Immunizations Current :   Yes   Medical History Verified? :   Yes   Pre-Visit Planning Status :   Completed   Tobacco Use? :   Former smoker   Emily Rodriguze MA - 5/31/2019 8:34 AM CDT   Consents   Consent for Immunization Exchange :   Consent Granted   Consent for Immunizations to Providers :   Consent Granted   Emily Rodriguez MA - 5/31/2019 8:34 AM CDT   Meds / Allergies   (As Of: 5/31/2019 8:39:01 AM CDT)   Allergies (Active)   No Known Medication Allergies  Estimated Onset Date:   Unspecified ; Created By:   Rosie Cramer CMA; Reaction Status:   Active ; Category:   Drug ; Substance:   No Known Medication Allergies ; Type:   Allergy ; Updated By:   Rosie Cramer CMA; Reviewed Date:   5/31/2019 8:37 AM CDT        Medication List   (As Of: 5/31/2019 8:39:01 AM CDT)   Prescription/Discharge Order    tadalafil  :   tadalafil ; Status:   Prescribed ; Ordered As Mnemonic:   Cialis 20 mg oral tablet ; Simple Display Line:   20 mg, 1 tab(s), po, daily, FAX [phone], 28 tab(s), 3 Refill(s) ; Ordering Provider:   Sree Arana MD; Catalog Code:   tadalafil ;  Order Dt/Tm:   2/13/2019 11:27:13 AM          metFORMIN  :   metFORMIN ; Status:   Prescribed ; Ordered As Mnemonic:   metFORMIN 500 mg oral tablet ; Simple Display Line:   500 mg, 1 tab(s), PO, BID, 180 tab(s), 3 Refill(s) ; Ordering Provider:   Sree Arana MD; Catalog Code:   metFORMIN ; Order Dt/Tm:   6/6/2018 9:29:43 AM          lisinopril  :   lisinopril ; Status:   Prescribed ; Ordered As Mnemonic:   lisinopril 20 mg oral tablet ; Simple Display Line:   20 mg, 1 tab(s), po, daily, 90 tab(s), 3 Refill(s) ; Ordering Provider:   Sree Arana MD; Catalog Code:   lisinopril ; Order Dt/Tm:   6/6/2018 9:29:41 AM          atorvastatin  :   atorvastatin ; Status:   Prescribed ; Ordered As Mnemonic:   atorvastatin 40 mg oral tablet ; Simple Display Line:   40 mg, 1 tab(s), PO, Daily, 90 tab(s), 3 Refill(s) ; Ordering Provider:   Sree Arana MD; Catalog Code:   atorvastatin ; Order Dt/Tm:   6/6/2018 9:29:38 AM          indomethacin  :   indomethacin ; Status:   Prescribed ; Ordered As Mnemonic:   Indocin 50 mg oral capsule ; Simple Display Line:   50 mg, 1 cap(s), PO, TID, PRN: for gout pain, 30 cap(s), 0 Refill(s) ; Ordering Provider:   Sree Arana MD; Catalog Code:   indomethacin ; Order Dt/Tm:   11/2/2016 2:51:40 PM            Home Meds    aspirin  :   aspirin ; Status:   Documented ; Ordered As Mnemonic:   aspirin 81 mg oral tablet ; Simple Display Line:   81 mg, 1 tab(s), po, daily, 0 Refill(s) ; Catalog Code:   aspirin ; Order Dt/Tm:   6/7/2017 9:21:57 AM

## 2022-02-16 NOTE — PROGRESS NOTES
Patient:   SUDHIR PINEDO            MRN: [id number]            FIN: [id number]               Age:   68 years     Sex:  Male     :  1950   Associated Diagnoses:   Impacted cerumen   Author:   Yasmeen BUSTILLOS, Sree      Procedure   Ear foreign body removal procedure   Date/ Time:  2019 9:05:00 AM.     Confirmed: patient.     Performed by: self.     Informed consent: Verbally obtained.     Indication: hearing disturbance, impacted Cerumen.     Location: left ear, right ear.     Preparation and technique: positioned sitting upright, method including (cerumen loop, curette, irrigation with warm tap water, otologic syringe).     Results: foreign body removal complete.     Procedure tolerated: well.     No Complications.        Impression and Plan   Diagnosis     Impacted cerumen ([id number] H61.23).     Orders     F/U  if not improving, sooner if getting worse.

## 2022-02-16 NOTE — LETTER
(Inserted Image. Unable to display)           [address] [place] [address]  [phone]    September 11, 2021      SU[place]IR [place] [address]        Dear SUDHIR,    Thank you for selecting [facility] for your healthcare needs. Below you will find the result of your recent test(s) done at our clinic.     This shows that your diabetes is very well controlled.      Result Name Current Result Previous Result Reference Range   Hgb A1c ((H)) 7.0 9/10/2021 ((H)) 10.3 5/19/2021  - <5.7       Please contact my practice at [phone] if you have any questions or concerns.      Sincerely,        Sree Arana MD ,   Charmaine Lockwood MD,   Germain Cortes PA-C                  What do you labs mean?  Below is a glossary of commonly ordered labs:  LDL - Bad Cholesterol HDL - Good Cholesterol  AST/ALT - Liver Function Cr/creatinine - Kidney Function  Microalbumin - Kidney Function  TSH - Thyroid Function PSA- Prostate  Hgb - iron stores/blood count Hgb A1c - Diabetic control

## 2022-02-16 NOTE — NURSING NOTE
Comprehensive Intake Entered On:  9/1/2020 9:29 AM CDT    Performed On:  9/1/2020 9:26 AM CDT by Emily Rodriguez MA               Summary   Chief Complaint :   DM med check, Review labs, Look at mole    Menstrual Status :   N/A   Weight Measured :   198 lb(Converted to: 198 lb 0 oz, 89.81 kg)    Height Measured :   68.25 in(Converted to: 5 ft 8 in, 173.35 cm)    Body Mass Index :   29.88 kg/m2 (HI)    Body Surface Area :   2.08 m2   Systolic Blood Pressure :   128 mmHg   Diastolic Blood Pressure :   74 mmHg   Mean Arterial Pressure :   92 mmHg   Peripheral Pulse Rate :   72 bpm   BP Site :   Right arm   Pulse Site :   Radial artery   BP Method :   Manual   HR Method :   Manual   Emily Rodriguez MA - 9/1/2020 9:26 AM CDT   Health Status   Allergies Verified? :   Yes   Medication History Verified? :   Yes   Immunizations Current :   Yes   Medical History Verified? :   Yes   Pre-Visit Planning Status :   Completed   Tobacco Use? :   Former smoker   Emily Rodriguez MA - 9/1/2020 9:26 AM CDT   Consents   Consent for Immunization Exchange :   Consent Granted   Consent for Immunizations to Providers :   Consent Granted   Emily Rodriguez MA - 9/1/2020 9:26 AM CDT   Meds / Allergies   (As Of: 9/1/2020 9:29:04 AM CDT)   Allergies (Active)   No Known Medication Allergies  Estimated Onset Date:   Unspecified ; Created By:   Rosie Cramer CMA; Reaction Status:   Active ; Category:   Drug ; Substance:   No Known Medication Allergies ; Type:   Allergy ; Updated By:   Rosie Cramer CMA; Reviewed Date:   9/1/2020 9:27 AM CDT        Medication List   (As Of: 9/1/2020 9:29:04 AM CDT)   Prescription/Discharge Order    atorvastatin  :   atorvastatin ; Status:   Prescribed ; Ordered As Mnemonic:   atorvastatin 40 mg oral tablet ; Simple Display Line:   40 mg, 1 tab(s), PO, Daily, 90 tab(s), 3 Refill(s) ; Ordering Provider:   Sree Arana MD; Catalog Code:   atorvastatin ; Order Dt/Tm:   5/19/2020 8:51:26 AM CDT          lisinopril   :   lisinopril ; Status:   Prescribed ; Ordered As Mnemonic:   lisinopril 20 mg oral tablet ; Simple Display Line:   20 mg, 1 tab(s), po, daily, 90 tab(s), 3 Refill(s) ; Ordering Provider:   Sree Arana MD; Catalog Code:   lisinopril ; Order Dt/Tm:   5/19/2020 8:51:26 AM CDT          metFORMIN  :   metFORMIN ; Status:   Prescribed ; Ordered As Mnemonic:   metFORMIN 500 mg oral tablet ; Simple Display Line:   500 mg, 1 tab(s), PO, BID, 180 tab(s), 3 Refill(s) ; Ordering Provider:   Sree Arana MD; Catalog Code:   metFORMIN ; Order Dt/Tm:   5/19/2020 8:51:27 AM CDT          tadalafil  :   tadalafil ; Status:   Prescribed ; Ordered As Mnemonic:   Cialis 20 mg oral tablet ; Simple Display Line:   20 mg, 1 tab(s), po, daily, 30 tab(s), 3 Refill(s) ; Ordering Provider:   Sree Arana MD; Catalog Code:   tadalafil ; Order Dt/Tm:   5/19/2020 8:51:27 AM CDT          indomethacin  :   indomethacin ; Status:   Prescribed ; Ordered As Mnemonic:   indomethacin 50 mg oral capsule ; Simple Display Line:   50 mg, 1 cap(s), Oral, tid, PRN: for gout pain, 30 cap(s), 0 Refill(s) ; Ordering Provider:   Sree Arana MD; Catalog Code:   indomethacin ; Order Dt/Tm:   7/9/2019 9:35:12 AM CDT            Home Meds    aspirin  :   aspirin ; Status:   Documented ; Ordered As Mnemonic:   aspirin 81 mg oral tablet ; Simple Display Line:   81 mg, 1 tab(s), po, daily, 0 Refill(s) ; Catalog Code:   aspirin ; Order Dt/Tm:   6/7/2017 9:21:57 AM CDT            ID Risk Screen   Recent Travel History :   No recent travel   Family Member Travel History :   No recent travel   Other Exposure to Infectious Disease :   Unknown   Emily Rodriguez MA 9/1/2020 9:26 AM CDT

## 2022-02-16 NOTE — LETTER
(Inserted Image. Unable to display)   December 03, 2020      SUDHIR BOONEMAIKEL  [address]   [place], [place] [id number]        Dear SUDHIR,      Thank you for selecting [facility] (previously [facility] & [facility]) for your healthcare needs.     Our records indicate you are due for the following services:    Diabetic Exam ~ Please bring your glucose meter and/or your blood glucose diary to your appointment.  Non-Fasting Lab    If you had your labs done at another facility or with [facility] at [facility], please bring in a copy of the results to your next visit, mail a copy, or drop off a copy of your results to your Healthcare Provider.    You are due for lab work and an office visit; please schedule the lab appointment 1 week before the office visit.  This will assure all results are available to discuss with your Healthcare Provider during your visit.    (FYI   Regarding office visits: In some instances, a video visit or telephone visit may be offered as an option.)    **It is very helpful if you bring your medication bottles to your appointment.  This assures we have all of your current medications, including strength and dosing information, documented accurately in your medical record.    To schedule an appointment or if you have further questions, please contact your clinic at [phone].       Powered by [email or web address]    Sincerely,    Sree Arana M.D.

## 2022-02-16 NOTE — NURSING NOTE
Comprehensive Intake Entered On:  12/9/2021 3:04 PM CST    Performed On:  12/9/2021 3:00 PM CST by Kim Goddard CMA               Summary   Chief Complaint :   Mole on the back of the head. Also requesting refills of Metformin.   Menstrual Status :   N/A   Weight Measured :   204 lb(Converted to: 204 lb 0 oz, 92.533 kg)    Systolic Blood Pressure :   136 mmHg (HI)    Diastolic Blood Pressure :   76 mmHg   Mean Arterial Pressure :   96 mmHg   Peripheral Pulse Rate :   64 bpm   BP Site :   Right arm   BP Method :   Manual   Kim Goddard CMA - 12/9/2021 3:00 PM CST   Health Status   Allergies Verified? :   Yes   Medication History Verified? :   Yes   Immunizations Current :   Yes   Medical History Verified? :   Yes   Pre-Visit Planning Status :   Completed   Tobacco Use? :   Former smoker   Kim Goddard CMA - 12/9/2021 3:00 PM CST   Consents   Consent for Immunization Exchange :   Consent Granted   Consent for Immunizations to Providers :   Consent Granted   Kim Goddard CMA - 12/9/2021 3:00 PM CST   Meds / Allergies   (As Of: 12/9/2021 3:04:36 PM CST)   Allergies (Active)   No Known Medication Allergies  Estimated Onset Date:   Unspecified ; Created By:   Rosie Cramer CMA; Reaction Status:   Active ; Category:   Drug ; Substance:   No Known Medication Allergies ; Type:   Allergy ; Updated By:   Rosie Cramer CMA; Reviewed Date:   12/9/2021 3:04 PM CST        Medication List   (As Of: 12/9/2021 3:04:36 PM CST)   Prescription/Discharge Order    atorvastatin  :   atorvastatin ; Status:   Prescribed ; Ordered As Mnemonic:   atorvastatin 40 mg oral tablet ; Simple Display Line:   40 mg, 1 tab(s), PO, Daily, 90 tab(s), 3 Refill(s) ; Ordering Provider:   Sree Arana MD; Catalog Code:   atorvastatin ; Order Dt/Tm:   5/28/2021 8:52:09 AM CDT          indomethacin  :   indomethacin ; Status:   Prescribed ; Ordered As Mnemonic:   indomethacin 50 mg oral capsule ; Simple Display Line:   50 mg, 1 cap(s), Oral,  tid, PRN: for gout pain, 30 cap(s), 0 Refill(s) ; Ordering Provider:   Sree Arana MD; Catalog Code:   indomethacin ; Order Dt/Tm:   7/9/2019 9:35:12 AM CDT          lisinopril  :   lisinopril ; Status:   Prescribed ; Ordered As Mnemonic:   lisinopril 20 mg oral tablet ; Simple Display Line:   20 mg, 1 tab(s), po, daily, 90 tab(s), 3 Refill(s) ; Ordering Provider:   Sree Arana MD; Catalog Code:   lisinopril ; Order Dt/Tm:   5/28/2021 8:52:08 AM CDT          metFORMIN  :   metFORMIN ; Status:   Prescribed ; Ordered As Mnemonic:   metFORMIN 500 mg oral tablet ; Simple Display Line:   1,000 mg, 2 tab(s), PO, BID, for 90 day(s), 360 tab(s), 3 Refill(s) ; Ordering Provider:   Sree Arana MD; Catalog Code:   metFORMIN ; Order Dt/Tm:   9/1/2020 9:34:09 AM CDT          Miscellaneous Prescription  :   Miscellaneous Prescription ; Status:   Prescribed ; Ordered As Mnemonic:   metFORMIN HCl 500 MG Oral Tablet ; Simple Display Line:   2 tab(s), Oral, bid, 360 tab(s), 0 Refill(s) ; Ordering Provider:   Sree Arana MD; Catalog Code:   Miscellaneous Prescription ; Order Dt/Tm:   9/28/2021 6:48:41 AM CDT          tadalafil  :   tadalafil ; Status:   Prescribed ; Ordered As Mnemonic:   Cialis 20 mg oral tablet ; Simple Display Line:   20 mg, 1 tab(s), po, daily, 30 tab(s), 3 Refill(s) ; Ordering Provider:   Sree Arana MD; Catalog Code:   tadalafil ; Order Dt/Tm:   5/28/2021 8:52:26 AM CDT            Home Meds    aspirin  :   aspirin ; Status:   Documented ; Ordered As Mnemonic:   aspirin 81 mg oral tablet ; Simple Display Line:   81 mg, 1 tab(s), po, daily, 0 Refill(s) ; Catalog Code:   aspirin ; Order Dt/Tm:   6/7/2017 9:21:57 AM MARGARETT

## 2022-02-16 NOTE — NURSING NOTE
Comprehensive Intake Entered On:  1/11/2019 8:13 AM CST    Performed On:  1/11/2019 8:09 AM CST by Emily Rodriguez MA               Summary   Chief Complaint :   Remove growth on back of head    Menstrual Status :   N/A   Weight Measured :   222.4 lb(Converted to: 222 lb 6 oz, 100.88 kg)    Height Measured :   68.25 in(Converted to: 5 ft 8 in, 173.35 cm)    Body Mass Index :   33.56 kg/m2 (HI)    Body Surface Area :   2.2 m2   Systolic Blood Pressure :   130 mmHg   Diastolic Blood Pressure :   70 mmHg   Mean Arterial Pressure :   90 mmHg   Peripheral Pulse Rate :   64 bpm   BP Site :   Left arm   Pulse Site :   Radial artery   BP Method :   Manual   HR Method :   Manual   Temperature Tympanic :   97.5 DegF(Converted to: 36.4 DegC)  (LOW)    Emily Rodriguez MA - 1/11/2019 8:09 AM CST   Health Status   Allergies Verified? :   Yes   Medication History Verified? :   Yes   Immunizations Current :   Yes   Medical History Verified? :   Yes   Pre-Visit Planning Status :   Completed   Tobacco Use? :   Former smoker   Emily Rodriguez MA - 1/11/2019 8:09 AM CST   Consents   Consent for Immunization Exchange :   Consent Granted   Consent for Immunizations to Providers :   Consent Granted   Emily Rodriguez MA - 1/11/2019 8:09 AM CST   Meds / Allergies   (As Of: 1/11/2019 8:13:59 AM CST)   Allergies (Active)   No Known Medication Allergies  Estimated Onset Date:   Unspecified ; Created By:   Rosie Cramer CMA; Reaction Status:   Active ; Category:   Drug ; Substance:   No Known Medication Allergies ; Type:   Allergy ; Updated By:   Rosie Cramer CMA; Reviewed Date:   1/11/2019 8:12 AM CST        Medication List   (As Of: 1/11/2019 8:13:59 AM CST)   Prescription/Discharge Order    tadalafil  :   tadalafil ; Status:   Prescribed ; Ordered As Mnemonic:   Cialis 20 mg oral tablet ; Simple Display Line:   20 mg, 1 tab(s), po, daily, 90 tab(s), 3 Refill(s) ; Ordering Provider:   Sree Arana MD; Catalog Code:   tadalafil ; Order  Dt/Tm:   6/6/2018 9:29:44 AM          metFORMIN  :   metFORMIN ; Status:   Prescribed ; Ordered As Mnemonic:   metFORMIN 500 mg oral tablet ; Simple Display Line:   500 mg, 1 tab(s), PO, BID, 180 tab(s), 3 Refill(s) ; Ordering Provider:   Sree Arana MD; Catalog Code:   metFORMIN ; Order Dt/Tm:   6/6/2018 9:29:43 AM          lisinopril  :   lisinopril ; Status:   Prescribed ; Ordered As Mnemonic:   lisinopril 20 mg oral tablet ; Simple Display Line:   20 mg, 1 tab(s), po, daily, 90 tab(s), 3 Refill(s) ; Ordering Provider:   Sree Arana MD; Catalog Code:   lisinopril ; Order Dt/Tm:   6/6/2018 9:29:41 AM          atorvastatin  :   atorvastatin ; Status:   Prescribed ; Ordered As Mnemonic:   atorvastatin 40 mg oral tablet ; Simple Display Line:   40 mg, 1 tab(s), PO, Daily, 90 tab(s), 3 Refill(s) ; Ordering Provider:   Sree Arana MD; Catalog Code:   atorvastatin ; Order Dt/Tm:   6/6/2018 9:29:38 AM          indomethacin  :   indomethacin ; Status:   Prescribed ; Ordered As Mnemonic:   Indocin 50 mg oral capsule ; Simple Display Line:   50 mg, 1 cap(s), PO, TID, PRN: for gout pain, 30 cap(s), 0 Refill(s) ; Ordering Provider:   Sree Arana MD; Catalog Code:   indomethacin ; Order Dt/Tm:   11/2/2016 2:51:40 PM            Home Meds    aspirin  :   aspirin ; Status:   Documented ; Ordered As Mnemonic:   aspirin 81 mg oral tablet ; Simple Display Line:   81 mg, 1 tab(s), po, daily, 0 Refill(s) ; Catalog Code:   aspirin ; Order Dt/Tm:   6/7/2017 9:21:57 AM

## 2022-02-16 NOTE — PROGRESS NOTES
Patient:   SUDHIR PINEDO            MRN: [id number]            FIN: [id number]               Age:   70 years     Sex:  Male     :  1950   Associated Diagnoses:   None   Author:   Sree Arana MD      Results Review   General results   Today's results   2020 11:08 AM CDT Height/Length Estimated 68.25 in    Weight Measured - Standard 200 lb    Temperature Tympanic 97.7 DegF  LOW    Peripheral Pulse Rate 64 bpm    Systolic Blood Pressure 128 mmHg    Diastolic Blood Pressure 76 mmHg    Mean Arterial Pressure 93 mmHg    BP Site Right arm    BP Method Electronic    Allergies Verified? Yes    Medication History Verified? Yes    Medical History Verified? Yes    Tobacco Use? Former smoker    Pre-Visit Planning Status Completed    Menstrual Status N/A    Recent Travel History No recent travel    Family Member Travel History No recent travel    Other Exposure to Infectious Disease Unknown    Chief Complaint Suture removal    Immunizations Current Yes    Consent for Immunization Exchange Consent Granted    Consent for Immunizations to Providers Consent Granted    Comprehensive Intake Form Comprehensive Intake Form    Intake Form Comprehensive Intake   2020 3:48 PM CDT Letter (Patient) Results Letter         Health Status   Allergies:    Allergic Reactions (Selected)  No Known Medication Allergies   Medications:  (Selected)   Prescriptions  Prescribed  Cialis 20 mg oral tablet: = 1 tab(s) ( 20 mg ), po, daily, # 30 tab(s), 3 Refill(s), Type: Maintenance, Pharmacy: [id number], 1 tab(s) Oral daily  atorvastatin 40 mg oral tablet: = 1 tab(s) ( 40 mg ), PO, Daily, # 90 tab(s), 3 Refill(s), Type: Maintenance, Pharmacy: [id number], 1 tab(s) Oral daily  indomethacin 50 mg oral capsule: = 1 cap(s) ( 50 mg ), Oral, tid, PRN: for gout pain, # 30 cap(s), 0 Refill(s), Type: Maintenance, Pharmacy: [id number], 1 cap(s) Oral tid,PRN:for gout pain  lisinopril 20 mg oral tablet: = 1 tab(s)  ( 20 mg ), po, daily, # 90 tab(s), 3 Refill(s), Type: Maintenance, Pharmacy: [id number], 1 tab(s) Oral daily  metFORMIN 500 mg oral tablet: = 2 tab(s) ( 1,000 mg ), PO, BID, # 360 tab(s), 3 Refill(s), Type: Maintenance, Pharmacy: [id number], 2 tab(s) Oral bid,x90 day(s), 68.25, in, 09/01/20 9:26:00 CDT, Height Measured, 198, lb, 09/01/20 9:26:00 CDT, Weight Measured  Documented Medications  Documented  aspirin 81 mg oral tablet: 1 tab(s) ( 81 mg ), po, daily, 0 Refill(s), Type: Maintenance   Problem list:    All Problems (Selected)  Hypertension / SNOMED CT 8617546246 / Confirmed  Prostatitis, unspecified / SNOMED CT 74854947 / Confirmed  Type 2 diabetes mellitus / SNOMED CT 857458997 / Confirmed  Type 2 diabetes mellitus, controlled, with renal complications / SNOMED CT 640075949 / Confirmed  Hyperlipidemia, combined / ICD-9-.4 / Confirmed  Obese / ICD-9-.00 / Probable  Hemorrhoids / ICD-9-.6 / Confirmed  Erectile Dysfunction / ICD-9-.84 / Confirmed      Subjective   Problem:  Here for suture removal      Objective   Vital Signs   9/8/2020 11:08 AM CDT Temperature Tympanic 97.7 DegF  LOW    Peripheral Pulse Rate 64 bpm    Systolic Blood Pressure 128 mmHg    Diastolic Blood Pressure 76 mmHg    Mean Arterial Pressure 93 mmHg    BP Site Right arm    BP Method Electronic      Integumentary:  Sutures removed and steristrips applied to laceration/excision area without problems..       Plan   Impression and Plan:  Diagnosis   .    Condition stable.    Orders     F/U  if not improving, sooner if getting worse.

## 2022-02-16 NOTE — NURSING NOTE
Comprehensive Intake Entered On:  1/2/2019 8:09 AM CST    Performed On:  1/2/2019 8:06 AM CST by Emily Rodriguez MA               Summary   Chief Complaint :   DM med check: Follow up DM labs   Menstrual Status :   N/A   Weight Measured :   221.8 lb(Converted to: 221 lb 13 oz, 100.61 kg)    Height Measured :   68.25 in(Converted to: 5 ft 8 in, 173.35 cm)    Body Mass Index :   33.47 kg/m2 (HI)    Body Surface Area :   2.2 m2   Systolic Blood Pressure :   130 mmHg   Diastolic Blood Pressure :   70 mmHg   Mean Arterial Pressure :   90 mmHg   Peripheral Pulse Rate :   72 bpm   BP Site :   Right arm   Pulse Site :   Radial artery   BP Method :   Manual   HR Method :   Manual   Emily Rodriguez MA - 1/2/2019 8:06 AM CST   Health Status   Allergies Verified? :   Yes   Medication History Verified? :   Yes   Immunizations Current :   Yes   Medical History Verified? :   Yes   Pre-Visit Planning Status :   Completed   Tobacco Use? :   Former smoker   Emily Rodriguez MA - 1/2/2019 8:06 AM CST   Consents   Consent for Immunization Exchange :   Consent Granted   Consent for Immunizations to Providers :   Consent Granted   Emily Rodriguez MA - 1/2/2019 8:06 AM CST   Meds / Allergies   (As Of: 1/2/2019 8:09:52 AM CST)   Allergies (Active)   No Known Medication Allergies  Estimated Onset Date:   Unspecified ; Created By:   Rosie Cramer CMA; Reaction Status:   Active ; Category:   Drug ; Substance:   No Known Medication Allergies ; Type:   Allergy ; Updated By:   Rosie Cramer CMA; Reviewed Date:   1/2/2019 8:08 AM CST        Medication List   (As Of: 1/2/2019 8:09:52 AM CST)   Prescription/Discharge Order    tadalafil  :   tadalafil ; Status:   Prescribed ; Ordered As Mnemonic:   Cialis 20 mg oral tablet ; Simple Display Line:   20 mg, 1 tab(s), po, daily, 90 tab(s), 3 Refill(s) ; Ordering Provider:   Sree Arana MD; Catalog Code:   tadalafil ; Order Dt/Tm:   6/6/2018 9:29:44 AM          metFORMIN  :   metFORMIN ; Status:    Prescribed ; Ordered As Mnemonic:   metFORMIN 500 mg oral tablet ; Simple Display Line:   500 mg, 1 tab(s), PO, BID, 180 tab(s), 3 Refill(s) ; Ordering Provider:   Sree Arana MD; Catalog Code:   metFORMIN ; Order Dt/Tm:   6/6/2018 9:29:43 AM          lisinopril  :   lisinopril ; Status:   Prescribed ; Ordered As Mnemonic:   lisinopril 20 mg oral tablet ; Simple Display Line:   20 mg, 1 tab(s), po, daily, 90 tab(s), 3 Refill(s) ; Ordering Provider:   Sree Arana MD; Catalog Code:   lisinopril ; Order Dt/Tm:   6/6/2018 9:29:41 AM          atorvastatin  :   atorvastatin ; Status:   Prescribed ; Ordered As Mnemonic:   atorvastatin 40 mg oral tablet ; Simple Display Line:   40 mg, 1 tab(s), PO, Daily, 90 tab(s), 3 Refill(s) ; Ordering Provider:   Sree Arana MD; Catalog Code:   atorvastatin ; Order Dt/Tm:   6/6/2018 9:29:38 AM          indomethacin  :   indomethacin ; Status:   Prescribed ; Ordered As Mnemonic:   Indocin 50 mg oral capsule ; Simple Display Line:   50 mg, 1 cap(s), PO, TID, PRN: for gout pain, 30 cap(s), 0 Refill(s) ; Ordering Provider:   Sree Arana MD; Catalog Code:   indomethacin ; Order Dt/Tm:   11/2/2016 2:51:40 PM            Home Meds    aspirin  :   aspirin ; Status:   Documented ; Ordered As Mnemonic:   aspirin 81 mg oral tablet ; Simple Display Line:   81 mg, 1 tab(s), po, daily, 0 Refill(s) ; Catalog Code:   aspirin ; Order Dt/Tm:   6/7/2017 9:21:57 AM

## 2022-02-16 NOTE — PROGRESS NOTES
Patient:   SUDHIR PINEDO            MRN: [id number]            FIN: [id number]               Age:   69 years     Sex:  Male     :  1950   Associated Diagnoses:   Type 2 diabetes mellitus, controlled, with renal complications   Author:   Sree Arana MD      Visit Information      Date of Service: 2020 07:55 am  Performing Location: [facility]  Encounter#: [id number]      Primary Care Provider (PCP):  Sree Arana MD    NPI# [id number]      Referring Provider:  Sree Arana MD, NPI# [id number]   Visit type:  Telephone Encounter.    Source of history:  Patient.    Location of patient:  home  Call Start Time:   844  Call End Time:    856      Chief Complaint   2020 8:02 AM CDT    Phone Visit: DM Med Check, HTN Med Check, Review Labs, Verbal Consent for phone visit        History of Present Illness   Today's visit was conducted via telephone due to the COVID-19 pandemic. Patient's consent to telephone visit was obtained and documented.      Reason for visit:  dm f/u      Review of Systems   Constitutional:  Negative.    Respiratory:  Negative.    Cardiovascular:  Negative.    Endocrine:  A1C up.       Impression and Plan   Diagnosis     Type 2 diabetes mellitus, controlled, with renal complications (LQB25-BY E11.29).     Course:  Worsening.    Orders     Orders   Requests (Return to Office):  Return to Clinic (Request) (Order): RFV: A1C 1 week prior, Return in 3 months.     Orders (Selected)   Prescriptions  Prescribed  Cialis 20 mg oral tablet: = 1 tab(s) ( 20 mg ), po, daily, # 30 tab(s), 3 Refill(s), Type: Maintenance, Pharmacy: [id number], 1 tab(s) Oral daily  atorvastatin 40 mg oral tablet: = 1 tab(s) ( 40 mg ), PO, Daily, # 90 tab(s), 3 Refill(s), Type: Maintenance, Pharmacy: [id number], 1 tab(s) Oral daily  lisinopril 20 mg oral tablet: = 1 tab(s) ( 20 mg ), po, daily, # 90 tab(s), 3 Refill(s), Type: Maintenance, Pharmacy:  [facility], 1 tab(s) Oral daily  metFORMIN 500 mg oral tablet: = 1 tab(s) ( 500 mg ), PO, BID, # 180 tab(s), 3 Refill(s), Type: Maintenance, Pharmacy: [facility], 1 tab(s) Oral bid.        Health Status   Allergies:    Allergic Reactions (Selected)  No Known Medication Allergies   Medications:  (Selected)   Prescriptions  Prescribed  Cialis 20 mg oral tablet: = 1 tab(s) ( 20 mg ), po, daily, Instructions: FAX [phone], # 28 tab(s), 3 Refill(s), Type: Maintenance  atorvastatin 40 mg oral tablet: = 1 tab(s) ( 40 mg ), PO, Daily, # 90 tab(s), 3 Refill(s), Type: Maintenance, Pharmacy: [facility], Patient coming today to fill as he is out., 1 tab(s) Oral daily  indomethacin 50 mg oral capsule: = 1 cap(s) ( 50 mg ), Oral, tid, PRN: for gout pain, # 30 cap(s), 0 Refill(s), Type: Maintenance, Pharmacy: [facility], 1 cap(s) Oral tid,PRN:for gout pain  lisinopril 20 mg oral tablet: = 1 tab(s) ( 20 mg ), po, daily, # 90 tab(s), 3 Refill(s), Type: Maintenance, Pharmacy: [facility], 1 tab(s) Oral daily  metFORMIN 500 mg oral tablet: = 1 tab(s) ( 500 mg ), PO, BID, # 180 tab(s), 3 Refill(s), Type: Maintenance, Pharmacy: [facility], 1 tab(s) Oral bid  Documented Medications  Documented  aspirin 81 mg oral tablet: 1 tab(s) ( 81 mg ), po, daily, 0 Refill(s), Type: Maintenance   Problem list:    All Problems  Hypertension / SNOMED CT 2918172326 / Confirmed  Prostatitis, unspecified / SNOMED CT 38446297 / Confirmed  Type 2 diabetes mellitus / SNOMED CT 541542308 / Confirmed  Type 2 diabetes mellitus, controlled, with renal complications / SNOMED CT 393383770 / Confirmed  Hyperlipidemia, combined / ICD-9-.4 / Confirmed  Obese / ICD-9-.00 / Probable  Hemorrhoids / ICD-9-.6 / Confirmed  Erectile Dysfunction / ICD-9-.84 / Confirmed      Histories   Past Medical History:    Active  Hemorrhoids (ICD-9-.6)  Prostatitis, unspecified (SNOMED  CT 83269284)  Hypertension (SNOMED CT 3141961323)  Erectile Dysfunction (ICD-9-.84)  Obese (ICD-9-.00)  Hyperlipidemia, combined (ICD-9-.4)  Type 2 diabetes mellitus (SNOMED CT 891100315)   Family History:    Hypertension  Father ()  Mother ()  Heart disease  Father ()     Procedure history:    Extracapsular cataract extraction and insertion of intraocular lens (SNOMED CT 311184592) performed by Karan Odom MD on 2015 at 64 Years.  Comments:  3/5/2015 9:30 AM CST - Yakelin Melissa  Left.  Colonoscopy (SNOMED CT 356683937) performed by Salvador Clark MD on 2013 at 63 Years.  Comments:  2013 8:55 AM CDT - Virgilio Soria MA  Normal colonoscopy repeat in 10 years  Laparoscopic appendectomy (SNOMED CT 87441258) in the month of 2012 at 62 Years.  Colonoscopy (SNOMED CT 278143364) performed by Tl Rodriguez MD on 2003 at 53 Years.  Comments:  7/10/2012 3:07 PM CDT - Rachell Pozo CMA  Every 10 years  Flexible sigmoidoscopy (SNOMED CT 52143109) performed by Sree Arana MD on 2000 at 49 Years.  Cataract surgery (SNOMED CT 326179497).  Comments:  2015 1:02 PM CDT - Iman Ozuna  left eye  Retinal detachment (SNOMED CT 05207833).  Comments:  2015 1:04 PM CDT - Iman Ozuna  Left eye    2015 1:04 PM CDT - Iman Ozuna  Repaired with laser  Crown of tooth (SNOMED CT 293137410).  Comments:  2018 9:19 AM CDT - Saritha Alberto CMA  2018   Social History:        Alcohol Assessment: Current            Beer (12 oz), 1-2 times per week, 1 drinks/episode average.      Tobacco Assessment: Past            Past, Cigarettes, 10 per day.  Started age 18 Years.  Stopped age 42 Years.            Quit in       Substance Abuse Assessment: Denies Substance Abuse            Never      Employment and Education Assessment            Retired            Work/School description: [employer].      Home and Environment Assessment             Marital status: .  Spouse/Partner name: Angela.  Lives with Spouse.  1 children.  Living situation:               Home/Independent.  Smoker in household: No.      Nutrition and Health Assessment            Diet: Low Sugar.  Type of diet: Regular.  Caffeine intake amount: Diet Coke, Coffee 1 cup a day.      Exercise and Physical Activity Assessment: Regular exercise            Exercise duration: 60.  Exercise frequency: 3-4 times/week.  Exercise type: Weight lifting.      Sexual Assessment            Sexually active: Yes.  Sexual orientation: Heterosexual.      Other Assessment             w/1 child and 4 step-children        Review / Management   Results review:  Lab results   5/12/2020 11:08 AM CDT Sodium Level 136 mmol/L    Potassium Level 4.8 mmol/L    Chloride Level 101 mmol/L    CO2 Level 27 mmol/L    Glucose Level 337 mg/dL  HI    BUN 21 mg/dL    Creatinine Level 1.07 mg/dL    BUN/Creat Ratio NOT APPLICABLE    eGFR 70 mL/min/1.73m2    eGFR African American 81 mL/min/1.73m2    Calcium Level 9.6 mg/dL    Bilirubin Total 0.9 mg/dL    Alkaline Phosphatase 78 unit/L    AST/SGOT 15 unit/L    ALT/SGPT 16 unit/L    Protein Total 6.3 gm/dL    Albumin Level 4.4 gm/dL    Globulin 1.9    A/G Ratio 2.3    Hgb A1c 12.7  HI    Cholesterol 128 mg/dL    Non-HDL Cholesterol 91    HDL 37 mg/dL  LOW    Cholesterol/HDL Ratio 3.5    LDL 67    Triglyceride 165 mg/dL  HI    U Microalbumin 0.3 mg/dL    Microalbumin Comment See comment    WBC 6.6    RBC 4.99    Hgb 16.0 gm/dL    Hct 44.2 %    MCV 88.6 fL    MCH 32.1 pg    MCHC 36.2 gm/dL  HI    RDW 12.8 %    Platelet 180    MPV 11.1 fL   .

## 2022-03-24 ENCOUNTER — TELEPHONE (OUTPATIENT)
Dept: FAMILY MEDICINE | Facility: CLINIC | Age: 72
End: 2022-03-24

## 2022-03-24 DIAGNOSIS — E11.9 TYPE 2 DIABETES MELLITUS WITHOUT COMPLICATION, WITHOUT LONG-TERM CURRENT USE OF INSULIN (H): ICD-10-CM

## 2022-03-24 DIAGNOSIS — I10 PRIMARY HYPERTENSION: Primary | ICD-10-CM

## 2022-03-24 PROBLEM — N52.9 ERECTILE DYSFUNCTION: Status: ACTIVE | Noted: 2022-03-24

## 2022-03-24 PROBLEM — E78.5 HYPERLIPIDEMIA: Status: ACTIVE | Noted: 2022-03-24

## 2022-03-24 PROBLEM — K64.9 HEMORRHOID: Status: ACTIVE | Noted: 2022-03-24

## 2022-03-24 PROBLEM — N41.9 PROSTATITIS: Status: ACTIVE | Noted: 2022-03-24

## 2022-03-24 PROBLEM — E66.9 OBESITY: Status: ACTIVE | Noted: 2022-03-24

## 2022-03-24 NOTE — TELEPHONE ENCOUNTER
LOV for DM: 5/28/21. Was due back in November 2021.  Last a1c on 9/10/21 @ 7.0  Last annual labs done 5/19/21 (CMP, ALICIA, Lipid and CBC).   Metformin last filled 12/9/21 #360/0. Pharmacy added and medication reconciled from [facility]. Advise

## 2022-03-24 NOTE — TELEPHONE ENCOUNTER
Reason for Call:  Other prescription    Detailed comments: Is due for appt in mid may and is going to run out of his metformin did you want patient to have a blood test in April and see you in May?  Please call patient he would like to discuss     Phone Number Patient can be reached at: Home number on file [phone] (home)    Best Time: any    Can we leave a detailed message on this number? NO    Call taken on 3/24/2022 at 1:27 PM by Milagro Ayoub

## 2022-04-05 ENCOUNTER — TELEPHONE (OUTPATIENT)
Dept: FAMILY MEDICINE | Facility: CLINIC | Age: 72
End: 2022-04-05

## 2022-04-05 DIAGNOSIS — E11.9 TYPE 2 DIABETES MELLITUS WITHOUT COMPLICATION, WITHOUT LONG-TERM CURRENT USE OF INSULIN (H): Primary | ICD-10-CM

## 2022-04-05 NOTE — TELEPHONE ENCOUNTER
Reason for Call:  Medication or medication refill:    Do you use a [facility]?  Name of the pharmacy and phone number for the current request:  [facility] [place]    Name of the medication requested: metformin    Other request: pt is back from [facility] and needs 30 day refill as discussed w/CHT    Can we leave a detailed message on this number? YES    Phone number patient can be reached at: Home number on file [phone] (home)    Best Time: anytime    Call taken on 4/5/2022 at 9:46 AM by Brittni Hairston

## 2022-04-05 NOTE — TELEPHONE ENCOUNTER
Pt informed refill was sent.    Prescription approved per [facility] Refill Protocol.  Last Written Prescription Date:  12/9/21  Last Fill Quantity: 360,  # refills: 0   Last office visit: Visit date not found with prescribing provider:   12/9/21

## 2022-05-11 ENCOUNTER — TELEPHONE (OUTPATIENT)
Dept: FAMILY MEDICINE | Facility: CLINIC | Age: 72
End: 2022-05-11

## 2022-05-11 ENCOUNTER — LAB (OUTPATIENT)
Dept: LAB | Facility: CLINIC | Age: 72
End: 2022-05-11
Payer: COMMERCIAL

## 2022-05-11 DIAGNOSIS — E11.9 TYPE 2 DIABETES MELLITUS WITHOUT COMPLICATION, WITHOUT LONG-TERM CURRENT USE OF INSULIN (H): ICD-10-CM

## 2022-05-11 DIAGNOSIS — I10 PRIMARY HYPERTENSION: ICD-10-CM

## 2022-05-11 LAB
ANION GAP SERPL CALCULATED.3IONS-SCNC: 5 MMOL/L (ref 3–14)
BUN SERPL-MCNC: 19 MG/DL (ref 7–30)
CALCIUM SERPL-MCNC: 9.3 MG/DL (ref 8.5–10.1)
CHLORIDE BLD-SCNC: 108 MMOL/L (ref 94–109)
CHOLEST SERPL-MCNC: 110 MG/DL
CO2 SERPL-SCNC: 27 MMOL/L (ref 20–32)
CREAT SERPL-MCNC: 0.91 MG/DL (ref 0.66–1.25)
CREAT UR-MCNC: 94 MG/DL
FASTING STATUS PATIENT QL REPORTED: YES
GFR SERPL CREATININE-BSD FRML MDRD: 90 ML/MIN/1.73M2
GLUCOSE BLD-MCNC: 178 MG/DL (ref 70–99)
HBA1C MFR BLD: 8.5 % (ref 0–5.6)
HDLC SERPL-MCNC: 36 MG/DL
LDLC SERPL CALC-MCNC: 58 MG/DL
MICROALBUMIN UR-MCNC: 11 MG/L
MICROALBUMIN/CREAT UR: 11.7 MG/G CR (ref 0–17)
NONHDLC SERPL-MCNC: 74 MG/DL
POTASSIUM BLD-SCNC: 4.4 MMOL/L (ref 3.4–5.3)
SODIUM SERPL-SCNC: 140 MMOL/L (ref 133–144)
TRIGL SERPL-MCNC: 82 MG/DL

## 2022-05-11 PROCEDURE — 82043 UR ALBUMIN QUANTITATIVE: CPT | Performed by: FAMILY MEDICINE

## 2022-05-11 PROCEDURE — 36415 COLL VENOUS BLD VENIPUNCTURE: CPT | Performed by: FAMILY MEDICINE

## 2022-05-11 PROCEDURE — 80048 BASIC METABOLIC PNL TOTAL CA: CPT | Performed by: FAMILY MEDICINE

## 2022-05-11 PROCEDURE — 80061 LIPID PANEL: CPT | Performed by: FAMILY MEDICINE

## 2022-05-11 PROCEDURE — 83036 HEMOGLOBIN GLYCOSYLATED A1C: CPT | Performed by: FAMILY MEDICINE

## 2022-05-11 NOTE — TELEPHONE ENCOUNTER
Last Written Prescription Date:  12/9/21  Last Fill Quantity: 360,  # refills: 0   Last office visit: 12/9/21  Future Office Visit:   Next 5 appointments (look out 90 days)    May 17, 2022  8:20 AM  (Arrive by 8:00 AM)  Provider Visit with Sree Arana MD  [facility] [address] [address]  [phone]               Prescription approved per [facility] Refill Protocol. 30 days.    Lorna Bill RN

## 2022-05-11 NOTE — TELEPHONE ENCOUNTER
Reason for Call:  Medication or medication refill:    Do you use a [facility]?  Name of the pharmacy and phone number for the current request:  [facility] [place], [place]    Name of the medication requested: metformin    Other request: pt will run out before appt that is scheduled next week    Can we leave a detailed message on this number? YES    Phone number patient can be reached at: Home number on file [phone] (home)    Best Time: anytime    Call taken on 5/11/2022 at 12:18 PM by Brittni Hairston

## 2022-05-17 ENCOUNTER — OFFICE VISIT (OUTPATIENT)
Dept: FAMILY MEDICINE | Facility: CLINIC | Age: 72
End: 2022-05-17
Payer: COMMERCIAL

## 2022-05-17 VITALS
OXYGEN SATURATION: 97 % | HEART RATE: 68 BPM | SYSTOLIC BLOOD PRESSURE: 130 MMHG | WEIGHT: 201 LBS | BODY MASS INDEX: 30.34 KG/M2 | RESPIRATION RATE: 16 BRPM | DIASTOLIC BLOOD PRESSURE: 62 MMHG

## 2022-05-17 DIAGNOSIS — N52.9 ERECTILE DYSFUNCTION, UNSPECIFIED ERECTILE DYSFUNCTION TYPE: ICD-10-CM

## 2022-05-17 DIAGNOSIS — E11.9 TYPE 2 DIABETES MELLITUS WITHOUT COMPLICATION, WITHOUT LONG-TERM CURRENT USE OF INSULIN (H): ICD-10-CM

## 2022-05-17 DIAGNOSIS — E78.00 PURE HYPERCHOLESTEROLEMIA: Primary | ICD-10-CM

## 2022-05-17 DIAGNOSIS — I10 PRIMARY HYPERTENSION: ICD-10-CM

## 2022-05-17 PROCEDURE — 99214 OFFICE O/P EST MOD 30 MIN: CPT | Performed by: FAMILY MEDICINE

## 2022-05-17 RX ORDER — ATORVASTATIN CALCIUM 40 MG/1
40 TABLET, FILM COATED ORAL DAILY
COMMUNITY
Start: 2022-02-13 | End: 2022-05-17

## 2022-05-17 RX ORDER — TADALAFIL 20 MG/1
20 TABLET ORAL DAILY PRN
COMMUNITY
Start: 2021-05-28 | End: 2022-05-17

## 2022-05-17 RX ORDER — LISINOPRIL 20 MG/1
20 TABLET ORAL DAILY
Qty: 90 TABLET | Refills: 3 | Status: SHIPPED | OUTPATIENT
Start: 2022-05-17 | End: 2022-09-15

## 2022-05-17 RX ORDER — LISINOPRIL 20 MG/1
20 TABLET ORAL DAILY
COMMUNITY
Start: 2022-02-02 | End: 2022-05-17

## 2022-05-17 RX ORDER — ATORVASTATIN CALCIUM 40 MG/1
40 TABLET, FILM COATED ORAL DAILY
Qty: 90 TABLET | Refills: 3 | Status: SHIPPED | OUTPATIENT
Start: 2022-05-17 | End: 2022-09-15

## 2022-05-17 RX ORDER — TADALAFIL 20 MG/1
20 TABLET ORAL DAILY PRN
Qty: 40 TABLET | Refills: 1 | Status: SHIPPED | OUTPATIENT
Start: 2022-05-17 | End: 2022-09-15

## 2022-05-17 RX ORDER — ASPIRIN 81 MG/1
81 TABLET ORAL DAILY
COMMUNITY

## 2022-05-17 NOTE — PROGRESS NOTES
Assessment & Plan     Type 2 diabetes mellitus without complication, without long-term current use of insulin (H)    - metFORMIN (GLUCOPHAGE) 500 MG tablet; Take 2 tablets (1,000 mg) by mouth 2 times daily (with meals)  - [facility] Adult Diabetes Educator Referral; Future    Pure hypercholesterolemia    - atorvastatin (LIPITOR) 40 MG tablet; Take 1 tablet (40 mg) by mouth daily    Primary hypertension    - lisinopril (ZESTRIL) 20 MG tablet; Take 1 tablet (20 mg) by mouth daily    Erectile dysfunction, unspecified erectile dysfunction type    - tadalafil (CIALIS) 20 MG tablet; Take 1 tablet (20 mg) by mouth daily as needed (ED)    Recent Results (from the past 720 hour(s))   Albumin Random Urine Quantitative with Creat Ratio    Collection Time: 05/11/22  7:51 AM   Result Value Ref Range    Creatinine Urine mg/dL 94 mg/dL    Albumin Urine mg/L 11 mg/L    Albumin Urine mg/g Cr 11.70 0.00 - 17.00 mg/g Cr   Basic metabolic panel    Collection Time: 05/11/22  7:51 AM   Result Value Ref Range    Sodium 140 133 - 144 mmol/L    Potassium 4.4 3.4 - 5.3 mmol/L    Chloride 108 94 - 109 mmol/L    Carbon Dioxide (CO2) 27 20 - 32 mmol/L    Anion Gap 5 3 - 14 mmol/L    Urea Nitrogen 19 7 - 30 mg/dL    Creatinine 0.91 0.66 - 1.25 mg/dL    Calcium 9.3 8.5 - 10.1 mg/dL    Glucose 178 (H) 70 - 99 mg/dL    GFR Estimate 90 >60 mL/min/1.73m2   Hemoglobin A1c    Collection Time: 05/11/22  7:51 AM   Result Value Ref Range    Hemoglobin A1C 8.5 (H) 0.0 - 5.6 %   Lipid panel reflex to direct LDL Fasting    Collection Time: 05/11/22  7:51 AM   Result Value Ref Range    Cholesterol 110 <200 mg/dL    Triglycerides 82 <150 mg/dL    Direct Measure HDL 36 (L) >=40 mg/dL    LDL Cholesterol Calculated 58 <=100 mg/dL    Non HDL Cholesterol 74 <130 mg/dL    Patient Fasting > 8hrs? Yes      Return in about 3 months (around 8/17/2022).    Sree Arana MD  [facility]    Fela Tomlin is a 71 year old who presents  for the following health issues: Chronic disease management    History of Present Illness       Diabetes:   He presents for follow up of diabetes.  He is not checking blood glucose. He has no concerns regarding his diabetes at this time.  He is not experiencing numbness or burning in feet, excessive thirst, blurry vision, weight changes or redness, sores or blisters on feet. The patient has had a diabetic eye exam in the last 12 months. Location of last eye exam [facility].        Hypertension: He presents for follow up of hypertension.  He does not check blood pressure  regularly outside of the clinic. Outpatient blood pressures have not been over 140/90. He follows a low salt diet.     He eats 2-3 servings of fruits and vegetables daily.He exercises with enough effort to increase his heart rate 30 to 60 minutes per day.  He exercises with enough effort to increase his heart rate 5 days per week.   He is taking medications regularly.     Review of Systems   Constitutional, HEENT, cardiovascular, pulmonary, gi and gu systems are negative, except as otherwise noted.      Objective    /62 (BP Location: Right arm, Patient Position: Sitting)   Pulse 68   Resp 16   Wt 91.2 kg (201 lb)   SpO2 97%   BMI 30.34 kg/m    Body mass index is 30.34 kg/m .  Physical Exam   GENERAL: healthy, alert and no distress  NECK: no adenopathy, no asymmetry, masses, or scars and thyroid normal to palpation  RESP: lungs clear to auscultation - no rales, rhonchi or wheezes  CV: regular rate and rhythm, normal S1 S2, no S3 or S4, no murmur, click or rub, no peripheral edema and peripheral pulses strong  ABDOMEN: soft, nontender, no hepatosplenomegaly, no masses and bowel sounds normal  MS: no gross musculoskeletal defects noted, no edema  NEURO: Sensory exam of the foot is normal, tested with the monofilament. Good pulses, no lesions or ulcers, good peripheral pulses.

## 2022-05-25 ENCOUNTER — ALLIED HEALTH/NURSE VISIT (OUTPATIENT)
Dept: EDUCATION SERVICES | Facility: CLINIC | Age: 72
End: 2022-05-25
Payer: COMMERCIAL

## 2022-05-25 VITALS — BODY MASS INDEX: 30.77 KG/M2 | HEIGHT: 68 IN | WEIGHT: 203 LBS

## 2022-05-25 DIAGNOSIS — E11.9 TYPE 2 DIABETES MELLITUS WITHOUT COMPLICATION, WITHOUT LONG-TERM CURRENT USE OF INSULIN (H): Primary | ICD-10-CM

## 2022-05-25 PROCEDURE — G0108 DIAB MANAGE TRN  PER INDIV: HCPCS | Performed by: DIETITIAN, REGISTERED

## 2022-05-25 RX ORDER — DULAGLUTIDE 0.75 MG/.5ML
0.75 INJECTION, SOLUTION SUBCUTANEOUS
Qty: 2 ML | Refills: 0 | Status: SHIPPED | OUTPATIENT
Start: 2022-05-25 | End: 2022-09-15

## 2022-05-25 NOTE — PROGRESS NOTES
"Gym 50 min 5 days/ week   weights, walking treadmill     Skim milk   Br cereal   Cheerios   Blueberries,   Toast ww   Jelly or jam     Lunch  Leftovers  Sub footlong ham and turkey a lot of veggie honey mustard  S/f flavored gibbons    Steak  Gravy   Diced potato corn  Skim    Diabetes Self-Management Education & Support    Presents for: Diabetes education initial visit but patient has had diabetes for numerous years.  Patient has never checked blood glucose with monitor.  The past few A1c's have fluctuated from 10.3% down to 7% now back up to 8.5%.  Patient will begin testing blood glucose and adding Trulicity.    SUBJECTIVE/OBJECTIVE:  Diabetes education in the past 24mo: No  Diabetes type: Type 2  Disease course: Stable  How confident are you filling out medical forms by yourself:: Somewhat  Cultural Influences/Ethnic Background:  Not  or     Diabetes Symptoms & Complications:  Fatigue: No  Neuropathy: No  Polydipsia: No  Polyphagia: Sometimes  Polyuria: No  Visual change: No  Slow healing wounds: No  Autonomic neuropathy: No  CVA: No  Heart disease: No  Nephropathy: No  Peripheral neuropathy: No  Peripheral Vascular Disease: No  Retinopathy: No  Sexual dysfunction: No    Patient Problem List and Family Medical History reviewed for relevant medical history, current medical status, and diabetes risk factors.    Vitals:  Ht 1.734 m (5' 8.25\")   Wt 92.1 kg (203 lb)   BMI 30.64 kg/m    Estimated body mass index is 30.64 kg/m  as calculated from the following:    Height as of this encounter: 1.734 m (5' 8.25\").    Weight as of this encounter: 92.1 kg (203 lb).   Last 3 BP:   BP Readings from Last 3 Encounters:   05/17/22 130/62   12/09/21 136/76   05/28/21 118/70       History   Smoking Status     Former Smoker     Types: Cigarettes     Quit date: 1/1/1992   Smokeless Tobacco     Never Used       Labs:  Lab Results   Component Value Date    A1C 8.5 05/11/2022     Lab Results   Component Value Date    "  05/11/2022     Lab Results   Component Value Date    LDL 58 05/11/2022     Direct Measure HDL   Date Value Ref Range Status   05/11/2022 36 (L) >=40 mg/dL Final   ]  GFR Estimate   Date Value Ref Range Status   05/11/2022 90 >60 mL/min/1.73m2 Final     Comment:     Effective December 21, 2021 eGFRcr in adults is calculated using the 2021 CKD-EPI creatinine equation which includes age and gender (Lydia et al., [place], DOI: 10.1056/HDJYga4463356)     No results found for: GFRESTBLACK  Lab Results   Component Value Date    CR 0.91 05/11/2022     No results found for: MICROALBUMIN    Healthy Eating:  Cultural/Spiritism diet restrictions?: No  Meal planning/habits: Avoiding sweets, Calorie counting, Low carb, Heart healthy, Low salt, Smaller portions  How many times a week on average do you eat food made away from home (restaurant/take-out)?: 3  Meals include: Breakfast, Lunch, Dinner  Beverages: Water, Coffee, Milk, Diet soda    Being Active:  Days per week of moderate to strenuous exercise (like a brisk walk): (P) 5  On average, minutes per day of exercise at this level: (P) 50  How intense was your typical exercise? : (P) Heavy (like jogging or swimming  Exercise Minutes per Week: (P) 250  Barrier to exercise: Time    Monitoring:  Blood Glucose Meter: Unknown  Times checking blood sugar at home (number): Never  Blood glucose trend: Fluctuating    Blood glucose today in office 166 mg/dL prior to a meal    Taking Medications:  Diabetes Medication(s)     Biguanides       metFORMIN (GLUCOPHAGE) 500 MG tablet    Take 2 tablets (1,000 mg) by mouth 2 times daily (with meals)    Incretin Mimetic Agents (GLP-1 Receptor Agonists)       dulaglutide (TRULICITY) 0.75 MG/0.5ML pen    Inject 0.75 mg Subcutaneous every 7 days          Current Treatments: Diet, Oral Medication (taken by mouth)    Problem Solving:                 Reducing Risks:  Has dilated eye exam at least once a year?: No  Sees dentist every 6 months?:  Yes  Feet checked by healthcare provider in the last year?: Yes    Healthy Coping:  Informal Support system:: Spouse  Patient Activation Measure Survey Score:  No flowsheet data found.    Diabetes knowledge and skills assessment:   Patient is knowledgeable in diabetes management concepts related to: Education today    Patient needs further education on the following diabetes management concepts: Healthy Eating, Being Active, Monitoring, Taking Medication, Problem Solving, Reducing Risks and Healthy Coping    Based on learning assessment above, most appropriate setting for further diabetes education would be: Individual setting.      INTERVENTIONS:    Education provided today on:  AADE Self-Care Behaviors:  Diabetes Pathophysiology  Healthy Eating: carbohydrate counting, heart healthy diet, portion control, plate planning method and label reading  Being Active: relationship to blood glucose  Monitoring: purpose, proper technique, individual blood glucose targets, frequency of monitoring and proper sharps disposal  Taking Medication: action of prescribed medication, proper site selection and rotation for injections, side effects of prescribed medications and when to take medications  Patient was instructed on Accu-Chek Guide Me meter and was able to provide an accurate return demonstration. Patient's blood glucose reading today was 166 mg/dL.  GLP-1 administration technique taught today. Patient verbalized understanding and was able to perform an accurate return demonstration of administration technique. Side effects were discussed, if patient has any abdominal pain, with or without nausea and/or vomiting, stop medication, call provider, clinic or go to the emergency room.    Opportunities for ongoing education and support in diabetes-self management were discussed.    Pt verbalized understanding of concepts discussed and recommendations provided today.       Education Materials Provided:  Goals for Your Diabetes  Care, BG Log Sheet, Carbohydrate Counting, Medication Information on Trulicity  and My Plate Planner      ASSESSMENT:  Patient is willing to adjust dietary intake to help control carbohydrates and portion control to help promote weight loss.  Adding Trulicity today.  Patient was very receptive to information education anticipate good compliance.     Goals Addressed as of 5/25/2022 at 1:30 PM       Taking Medication     Added 5/25/22 by Debby Peraza RD     Patient will begin taking Trulicity once weekly as directed              Patient's most recent   Lab Results   Component Value Date    A1C 8.5 05/11/2022    is not meeting goal of <7.0    PLAN  See Patient Instructions for co-developed, patient-stated behavior change goals.  AVS printed and provided to patient today. See Follow-Up section for recommended follow-up.      Time Spent: 60 minutes  Encounter Type: Individual    Any diabetes medication dose changes were made via the CDE Protocol and Collaborative Practice Agreement with the patient's referring provider. A copy of this encounter was shared with the provider.

## 2022-05-25 NOTE — PATIENT INSTRUCTIONS
Goals:  Practice healthy stress management and mindful eating - think are you physically hungry or are you bored, stressed, emotional etc, make of list of things to do besides eat.    Try to get good quality sleep with a goal of 7-8 hours per night.  Stay physically active daily.  Recommend working up to a total of 30 minutes on 5 days/ week.  Recommend a fitness tracker.     Eat in a healthy way- eliminate trans fats, limit saturated fats and added sugars; follow the plate method - picture above.  Keep a food record (MyFitnessPal, Loseit).    A meal is 3 or more food groups; make it colorful for better nutrition.    Total Carbohydrates (in grams) = Breakfast  30    Lunch  30    Supper  30    If desired snacks 15                                   Trulicity 0.75 mg x 4 weeks  1.5 mg x 4 weeks   3.0 mg x 4 weeks   4.5 mg x 4 weeks     Call me at  direct

## 2022-05-25 NOTE — LETTER
"    5/25/2022         RE: Nabor Lin  [address]  [facility] [address]        Dear Colleague,    Thank you for referring your patient, Nabor Lin, to the [facility]. Please see a copy of my visit note below.    Gym 50 min 5 days/ week   weights, walking treadmill     Skim milk   Br cereal   Cheerios   Blueberries,   Toast ww   Jelly or jam     Lunch  Leftovers  Sub footlong ham and turkey a lot of veggie honey mustard  S/f flavored gibbons    Steak  Gravy   Diced potato corn  Skim    Diabetes Self-Management Education & Support    Presents for: Diabetes education initial visit but patient has had diabetes for numerous years.  Patient has never checked blood glucose with monitor.  The past few A1c's have fluctuated from 10.3% down to 7% now back up to 8.5%.  Patient will begin testing blood glucose and adding Trulicity.    SUBJECTIVE/OBJECTIVE:  Diabetes education in the past 24mo: No  Diabetes type: Type 2  Disease course: Stable  How confident are you filling out medical forms by yourself:: Somewhat  Cultural Influences/Ethnic Background:  Not  or     Diabetes Symptoms & Complications:  Fatigue: No  Neuropathy: No  Polydipsia: No  Polyphagia: Sometimes  Polyuria: No  Visual change: No  Slow healing wounds: No  Autonomic neuropathy: No  CVA: No  Heart disease: No  Nephropathy: No  Peripheral neuropathy: No  Peripheral Vascular Disease: No  Retinopathy: No  Sexual dysfunction: No    Patient Problem List and Family Medical History reviewed for relevant medical history, current medical status, and diabetes risk factors.    Vitals:  Ht 1.734 m (5' 8.25\")   Wt 92.1 kg (203 lb)   BMI 30.64 kg/m    Estimated body mass index is 30.64 kg/m  as calculated from the following:    Height as of this encounter: 1.734 m (5' 8.25\").    Weight as of this encounter: 92.1 kg (203 lb).   Last 3 BP:   BP Readings from Last 3 Encounters:   05/17/22 130/62   12/09/21 136/76 "   05/28/21 118/70       History   Smoking Status     Former Smoker     Types: Cigarettes     Quit date: 1/1/1992   Smokeless Tobacco     Never Used       Labs:  Lab Results   Component Value Date    A1C 8.5 05/11/2022     Lab Results   Component Value Date     05/11/2022     Lab Results   Component Value Date    LDL 58 05/11/2022     Direct Measure HDL   Date Value Ref Range Status   05/11/2022 36 (L) >=40 mg/dL Final   ]  GFR Estimate   Date Value Ref Range Status   05/11/2022 90 >60 mL/min/1.73m2 Final     Comment:     Effective December 21, 2021 eGFRcr in adults is calculated using the 2021 CKD-EPI creatinine equation which includes age and gender (Lydia et al., NEJM, DOI: 10.1056/AQSNse4290701)     No results found for: GFRESTBLACK  Lab Results   Component Value Date    CR 0.91 05/11/2022     No results found for: MICROALBUMIN    Healthy Eating:  Cultural/Advent diet restrictions?: No  Meal planning/habits: Avoiding sweets, Calorie counting, Low carb, Heart healthy, Low salt, Smaller portions  How many times a week on average do you eat food made away from home (restaurant/take-out)?: 3  Meals include: Breakfast, Lunch, Dinner  Beverages: Water, Coffee, Milk, Diet soda    Being Active:  Days per week of moderate to strenuous exercise (like a brisk walk): (P) 5  On average, minutes per day of exercise at this level: (P) 50  How intense was your typical exercise? : (P) Heavy (like jogging or swimming  Exercise Minutes per Week: (P) 250  Barrier to exercise: Time    Monitoring:  Blood Glucose Meter: Unknown  Times checking blood sugar at home (number): Never  Blood glucose trend: Fluctuating    Blood glucose today in office 166 mg/dL prior to a meal    Taking Medications:  Diabetes Medication(s)     Biguanides       metFORMIN (GLUCOPHAGE) 500 MG tablet    Take 2 tablets (1,000 mg) by mouth 2 times daily (with meals)    Incretin Mimetic Agents (GLP-1 Receptor Agonists)       dulaglutide (TRULICITY) 0.75  MG/0.5ML pen    Inject 0.75 mg Subcutaneous every 7 days          Current Treatments: Diet, Oral Medication (taken by mouth)    Problem Solving:                 Reducing Risks:  Has dilated eye exam at least once a year?: No  Sees dentist every 6 months?: Yes  Feet checked by healthcare provider in the last year?: Yes    Healthy Coping:  Informal Support system:: Spouse  Patient Activation Measure Survey Score:  No flowsheet data found.    Diabetes knowledge and skills assessment:   Patient is knowledgeable in diabetes management concepts related to: Education today    Patient needs further education on the following diabetes management concepts: Healthy Eating, Being Active, Monitoring, Taking Medication, Problem Solving, Reducing Risks and Healthy Coping    Based on learning assessment above, most appropriate setting for further diabetes education would be: Individual setting.      INTERVENTIONS:    Education provided today on:  AADE Self-Care Behaviors:  Diabetes Pathophysiology  Healthy Eating: carbohydrate counting, heart healthy diet, portion control, plate planning method and label reading  Being Active: relationship to blood glucose  Monitoring: purpose, proper technique, individual blood glucose targets, frequency of monitoring and proper sharps disposal  Taking Medication: action of prescribed medication, proper site selection and rotation for injections, side effects of prescribed medications and when to take medications  Patient was instructed on Accu-Chek Guide Me meter and was able to provide an accurate return demonstration. Patient's blood glucose reading today was 166 mg/dL.  GLP-1 administration technique taught today. Patient verbalized understanding and was able to perform an accurate return demonstration of administration technique. Side effects were discussed, if patient has any abdominal pain, with or without nausea and/or vomiting, stop medication, call provider, clinic or go to the emergency  room.    Opportunities for ongoing education and support in diabetes-self management were discussed.    Pt verbalized understanding of concepts discussed and recommendations provided today.       Education Materials Provided:  Goals for Your Diabetes Care, BG Log Sheet, Carbohydrate Counting, Medication Information on Trulicity  and My Plate Planner      ASSESSMENT:  Patient is willing to adjust dietary intake to help control carbohydrates and portion control to help promote weight loss.  Adding Trulicity today.  Patient was very receptive to information education anticipate good compliance.     Goals Addressed as of 5/25/2022 at 1:30 PM       Taking Medication     Added 5/25/22 by Debby Peraza, CINDY     Patient will begin taking Trulicity once weekly as directed              Patient's most recent   Lab Results   Component Value Date    A1C 8.5 05/11/2022    is not meeting goal of <7.0    PLAN  See Patient Instructions for co-developed, patient-stated behavior change goals.  AVS printed and provided to patient today. See Follow-Up section for recommended follow-up.      Time Spent: 60 minutes  Encounter Type: Individual    Any diabetes medication dose changes were made via the CDE Protocol and Collaborative Practice Agreement with the patient's referring provider. A copy of this encounter was shared with the provider.

## 2022-06-10 DIAGNOSIS — E11.9 TYPE 2 DIABETES MELLITUS WITHOUT COMPLICATION, WITHOUT LONG-TERM CURRENT USE OF INSULIN (H): ICD-10-CM

## 2022-06-10 RX ORDER — BLOOD SUGAR DIAGNOSTIC
STRIP MISCELLANEOUS
Qty: 100 STRIP | Refills: 1 | Status: SHIPPED | OUTPATIENT
Start: 2022-06-10 | End: 2022-06-10

## 2022-06-10 RX ORDER — BLOOD SUGAR DIAGNOSTIC
1 STRIP MISCELLANEOUS DAILY
Qty: 100 STRIP | Refills: 1 | Status: SHIPPED | OUTPATIENT
Start: 2022-06-10 | End: 2023-02-21

## 2022-06-10 NOTE — TELEPHONE ENCOUNTER
"Fax from [facility] stating that per Medicare, the SIG cannot say \"or as directed\"    Please send new Rx without those instructions.   "

## 2022-06-13 ENCOUNTER — TELEPHONE (OUTPATIENT)
Dept: FAMILY MEDICINE | Facility: CLINIC | Age: 72
End: 2022-06-13
Payer: COMMERCIAL

## 2022-06-13 NOTE — TELEPHONE ENCOUNTER
Patient calls he has been working very hard on his diet and wife is supportive.  Patient has completed 3 weeks of the Trulicity and will take his fourth dose of 0.75 mg later this week.  Patient is also taking metformin as directed.  Patient would like to discontinue Trulicity after his last dose of the 0.75 mg and monitor glucose readings.  Patient will follow-up with CDE in 1 month to review glucose readings.

## 2022-06-16 NOTE — PROCEDURES
Accession Number:       [id number]  PATHOLOGIST:     Sandra Dickerson MD Board Certified in Anatomic Pathology and Clinical Pathology (electronic signature)  A SOURCE:     Skin, scalp, shave biopsy  A GROSS DESCRIPTION:     See comment       Specimen is received in formalin, labeled with       multiple patient identifier(s) and consists of       one piece from a skin biopsy measuring 1.0 x 1.0       x 0.1 cm, irregular in shape and tan-gray in       color. The margins are inked green. The specimen       is trisected and entirely submitted in one       cassette(s).         Gross exam(s) performed at: [facility] [address] [address]         : MOMO ZHANG MD  A DIAGNOSIS:     Consistent with pigmented actinic keratosis.  A COMMENT:     No evidence of melanocytic lesion; melanocytic marker Melan-A (negative result) was performed (all positive and required negative controls stained appropriately).

## 2022-06-16 NOTE — PROCEDURES
Accession Number:       [id number]  PATHOLOGIST:     Manuel Prakash M.D., Board Certified in Anatomic Pathology and Clinical Pathology 0  (electronic signature)  A SOURCE:     Skin, left side of forehead  A GROSS DESCRIPTION:     See comment       Specimen is received in formalin, labeled with       multiple patient identifier(s) and consists of       one piece of skin measuring 0.6 x 0.5 x 0.2 cm,       irregular in shape and tan-brown in color. The       margins are inked green. The specimen is       trisected and entirely submitted in one       cassette(s).           Gross exam(s) performed at: [address] [address]         : MOMO ZHANG MD  A DIAGNOSIS:     Benign seborrheic keratosis. Incidental solar elastosis.

## 2022-06-16 NOTE — PROCEDURES
Accession Number:       [id number]  PATHOLOGIST:     See comment       Jay Serrano Jr., M.D., Board Certified in Anatomic       Pathology, Clinical Pathology and Cytopathology,       1 [phone]   (electronic signature)  A SOURCE:     Skin, right calf, biopsy  A GROSS DESCRIPTION:     See comment       Specimen is received in formalin, labeled with       multiple patient identifier(s) and consists of       one piece from a skin punch biopsy measuring 0.55       x 0.55 x 0.55 cm, circular in shape and tan-gray       in color, with a pigmented area measuring 0.2 x       0.2 cm and tan-brown in color. The margins are       inked green. The specimen is bisected and       entirely submitted in one cassette(s).         Gross exam(s) performed at: [address] [address]         : MOMO ZHANG MD  A DIAGNOSIS:     Junctional melanocytic nevus. Margins appear uninvolved.

## 2022-06-16 NOTE — PROCEDURES
Accession Number:       [id number]  CLINICAL INFORMATION:     Atypical mole  PATHOLOGIST:     Manuel Prakash M.D., Board Certified in Anatomic Pathology and Clinical Pathology 1  (electronic signature)  A SOURCE:     Skin, scalp  A GROSS DESCRIPTION:     See comment       Specimen is received in formalin, labeled with       multiple patient identifier(s) and consists of       one piece of skin measuring 1.0 x 0.7 x 0.3 cm,       irregular in shape and tan-gray in color. The       margins are inked green. The specimen is serially       sectioned and entirely submitted in one       cassette(s).           Gross exam(s) performed at: [address] [address]         : MOMO ZHANG MD  A DIAGNOSIS:     Benign seborrheic keratosis.

## 2022-07-19 DIAGNOSIS — E11.9 TYPE 2 DIABETES MELLITUS WITHOUT COMPLICATION, WITHOUT LONG-TERM CURRENT USE OF INSULIN (H): ICD-10-CM

## 2022-09-07 ENCOUNTER — LAB (OUTPATIENT)
Dept: LAB | Facility: CLINIC | Age: 72
End: 2022-09-07
Payer: COMMERCIAL

## 2022-09-07 DIAGNOSIS — E11.9 TYPE 2 DIABETES MELLITUS WITHOUT COMPLICATION, WITHOUT LONG-TERM CURRENT USE OF INSULIN (H): ICD-10-CM

## 2022-09-07 LAB — HBA1C MFR BLD: 5.5 % (ref 0–5.6)

## 2022-09-07 PROCEDURE — 36415 COLL VENOUS BLD VENIPUNCTURE: CPT

## 2022-09-07 PROCEDURE — 83036 HEMOGLOBIN GLYCOSYLATED A1C: CPT

## 2022-09-15 ENCOUNTER — OFFICE VISIT (OUTPATIENT)
Dept: FAMILY MEDICINE | Facility: CLINIC | Age: 72
End: 2022-09-15
Payer: COMMERCIAL

## 2022-09-15 VITALS
BODY MASS INDEX: 27.89 KG/M2 | WEIGHT: 184 LBS | DIASTOLIC BLOOD PRESSURE: 60 MMHG | RESPIRATION RATE: 16 BRPM | HEART RATE: 75 BPM | OXYGEN SATURATION: 95 % | HEIGHT: 68 IN | SYSTOLIC BLOOD PRESSURE: 108 MMHG

## 2022-09-15 DIAGNOSIS — E78.00 PURE HYPERCHOLESTEROLEMIA: ICD-10-CM

## 2022-09-15 DIAGNOSIS — N52.9 ERECTILE DYSFUNCTION, UNSPECIFIED ERECTILE DYSFUNCTION TYPE: ICD-10-CM

## 2022-09-15 DIAGNOSIS — I10 PRIMARY HYPERTENSION: ICD-10-CM

## 2022-09-15 DIAGNOSIS — Z11.59 NEED FOR HEPATITIS C SCREENING TEST: Primary | ICD-10-CM

## 2022-09-15 DIAGNOSIS — E11.9 TYPE 2 DIABETES MELLITUS WITHOUT COMPLICATION, WITHOUT LONG-TERM CURRENT USE OF INSULIN (H): ICD-10-CM

## 2022-09-15 DIAGNOSIS — Z13.6 ENCOUNTER FOR ABDOMINAL AORTIC ANEURYSM (AAA) SCREENING: ICD-10-CM

## 2022-09-15 DIAGNOSIS — H61.23 BILATERAL IMPACTED CERUMEN: ICD-10-CM

## 2022-09-15 PROCEDURE — 99207 PR FOOT EXAM NO CHARGE: CPT | Mod: 25 | Performed by: FAMILY MEDICINE

## 2022-09-15 PROCEDURE — 99214 OFFICE O/P EST MOD 30 MIN: CPT | Mod: 25 | Performed by: FAMILY MEDICINE

## 2022-09-15 PROCEDURE — 69210 REMOVE IMPACTED EAR WAX UNI: CPT | Performed by: FAMILY MEDICINE

## 2022-09-15 RX ORDER — TADALAFIL 20 MG/1
20 TABLET ORAL DAILY PRN
Qty: 40 TABLET | Refills: 2 | Status: SHIPPED | OUTPATIENT
Start: 2022-09-15 | End: 2023-02-21

## 2022-09-15 RX ORDER — ATORVASTATIN CALCIUM 40 MG/1
40 TABLET, FILM COATED ORAL DAILY
Qty: 90 TABLET | Refills: 3 | Status: SHIPPED | OUTPATIENT
Start: 2022-09-15 | End: 2023-02-21

## 2022-09-15 RX ORDER — LISINOPRIL 20 MG/1
20 TABLET ORAL DAILY
Qty: 90 TABLET | Refills: 3 | Status: SHIPPED | OUTPATIENT
Start: 2022-09-15 | End: 2023-02-21

## 2022-09-15 NOTE — PROGRESS NOTES
"  Assessment & Plan     Primary hypertension  Controlled by prescription medication prescribed by me and up to date.  - lisinopril (ZESTRIL) 20 MG tablet; Take 1 tablet (20 mg) by mouth daily    Type 2 diabetes mellitus without complication, without long-term current use of insulin (H)  Controlled by prescription medication prescribed by me and up to date.  - metFORMIN (GLUCOPHAGE) 500 MG tablet; Take 2 tablets (1,000 mg) by mouth 2 times daily (with meals)    Pure hypercholesterolemia  Controlled by prescription medication prescribed by me and up to date.  - atorvastatin (LIPITOR) 40 MG tablet; Take 1 tablet (40 mg) by mouth daily    Erectile dysfunction, unspecified erectile dysfunction type  Controlled by prescription medication prescribed by me and up to date.  - tadalafil (CIALIS) 20 MG tablet; Take 1 tablet (20 mg) by mouth daily as needed (ED)    Encounter for abdominal aortic aneurysm (AAA) screening  - [place] Aorta Medicare AAA Screening; Future     Cerumin Impaction  Ears Irrigated    BMI:   Estimated body mass index is 27.77 kg/m  as calculated from the following:    Height as of this encounter: 1.734 m (5' 8.25\").    Weight as of this encounter: 83.5 kg (184 lb).     F/U 6 months    Sree Arana MD  [facility]    Fela Tomlin is a 72 year old, presenting for the following health issues:  Chronic Disease Management (Medication refills)      History of Present Illness       Diabetes:   He presents for follow up of diabetes.  He is checking home blood glucose a few times a week. He checks blood glucose before and after meals.  Blood glucose is never over 200 and never under 70. When his blood glucose is low, the patient is asymptomatic for confusion, blurred vision, lethargy and reports not feeling dizzy, shaky, or weak.  He has no concerns regarding his diabetes at this time.  He is not experiencing numbness or burning in feet, excessive thirst, blurry vision, " "weight changes or redness, sores or blisters on feet. The patient has not had a diabetic eye exam in the last 12 months.         Hyperlipidemia:  He presents for follow up of hyperlipidemia.  He is taking medication to lower cholesterol. He is not having myalgia or other side effects to statin medications.    Hypertension: He presents for follow up of hypertension.  He does not check blood pressure  regularly outside of the clinic. Outpatient blood pressures have not been over 140/90. He does not follow a low salt diet.     He eats 2-3 servings of fruits and vegetables daily.He consumes 0 sweetened beverage(s) daily.He exercises with enough effort to increase his heart rate 30 to 60 minutes per day.  He exercises with enough effort to increase his heart rate 5 days per week.   He is taking medications regularly.           Objective    /60 (BP Location: Right arm, Patient Position: Sitting)   Pulse 75   Resp 16   Ht 1.734 m (5' 8.25\")   Wt 83.5 kg (184 lb)   SpO2 95%   BMI 27.77 kg/m    Body mass index is 27.77 kg/m .  Physical Exam   GENERAL: healthy, alert and no distress  HEENT On exam both EAC's blocked with impacted cerumen.  Cerumen removed with warm water irrigation and with lighted ear spoon without incident.  Procedure tolerated well.  NECK: no adenopathy, no asymmetry, masses, or scars and thyroid normal to palpation  RESP: lungs clear to auscultation - no rales, rhonchi or wheezes  CV: regular rate and rhythm, normal S1 S2, no S3 or S4, no murmur, click or rub, no peripheral edema and peripheral pulses strong  ABDOMEN: soft, nontender, no hepatosplenomegaly, no masses and bowel sounds normal  MS: no gross musculoskeletal defects noted, no edema    Sensory exam of the foot is normal, tested with the monofilament. Good pulses, no lesions or ulcers, good peripheral pulses.                    "

## 2022-10-11 ENCOUNTER — TRANSFERRED RECORDS (OUTPATIENT)
Dept: HEALTH INFORMATION MANAGEMENT | Facility: CLINIC | Age: 72
End: 2022-10-11

## 2022-10-11 LAB — RETINOPATHY: NEGATIVE

## 2022-11-25 ENCOUNTER — OFFICE VISIT (OUTPATIENT)
Dept: FAMILY MEDICINE | Facility: CLINIC | Age: 72
End: 2022-11-25
Payer: COMMERCIAL

## 2022-11-25 VITALS
HEART RATE: 76 BPM | WEIGHT: 179 LBS | BODY MASS INDEX: 27.02 KG/M2 | DIASTOLIC BLOOD PRESSURE: 74 MMHG | TEMPERATURE: 97.3 F | SYSTOLIC BLOOD PRESSURE: 116 MMHG

## 2022-11-25 DIAGNOSIS — H25.9 SENILE CATARACT OF RIGHT EYE, UNSPECIFIED AGE-RELATED CATARACT TYPE: Primary | ICD-10-CM

## 2022-11-25 PROCEDURE — 99214 OFFICE O/P EST MOD 30 MIN: CPT | Performed by: FAMILY MEDICINE

## 2022-11-25 NOTE — PROGRESS NOTES
[address] [address]  Phone: [phone]  Fax: [phone]  Primary Provider: Sree Arana  Pre-op Performing Provider: ALEJANDRA BREWER      PREOPERATIVE EVALUATION:  Today's date: 11/25/2022    Nabor Lin is a 72 year old male who presents for a preoperative evaluation.    Surgical Information:  Surgery/Procedure: R cataract  Surgery Location: [facility]  Surgeon: Dr. Odom  Surgery Date: 11/29/22  Time of Surgery: TBD  Where patient plans to recover: At home with family  Fax number for surgical facility: Note does not need to be faxed, will be available electronically in Epic.    Type of Anesthesia Anticipated: Choice    Assessment & Plan     The proposed surgical procedure is considered LOW risk.    Senile cataract of right eye, unspecified age-related cataract type  Patient is cleared for surgery with IV sedation and block                     RECOMMENDATION:  APPROVAL GIVEN to proceed with proposed procedure, without further diagnostic evaluation.    Subjective     HPI related to upcoming procedure: Patient has symptomatic cataract on right has previously had his left side done    Preop Questions 11/25/2022   1. Have you ever had a heart attack or stroke? No   2. Have you ever had surgery on your heart or blood vessels, such as a stent placement, a coronary artery bypass, or surgery on an artery in your head, neck, heart, or legs? No   3. Do you have chest pain with activity? No   4. Do you have a history of  heart failure? No   5. Do you currently have a cold, bronchitis or symptoms of other infection? No   6. Do you have a cough, shortness of breath, or wheezing? No   7. Do you or anyone in your family have previous history of blood clots? No   8. Do you or does anyone in your family have a serious bleeding problem such as prolonged bleeding following surgeries or cuts? No   9. Have you ever had problems with anemia or been told  to take iron pills? No   10. Have you had any abnormal blood loss such as black, tarry or bloody stools? No   11. Have you ever had a blood transfusion? No   12. Are you willing to have a blood transfusion if it is medically needed before, during, or after your surgery? Yes   13. Have you or any of your relatives ever had problems with anesthesia? No   14. Do you have sleep apnea, excessive snoring or daytime drowsiness? No   15. Do you have any artifical heart valves or other implanted medical devices like a pacemaker, defibrillator, or continuous glucose monitor? No   16. Do you have artificial joints? No   17. Are you allergic to latex? No           Review of Systems  CONSTITUTIONAL: NEGATIVE for fever, chills, change in weight  ENT/MOUTH: NEGATIVE for ear, mouth and throat problems  RESP: NEGATIVE for significant cough or SOB  CV: NEGATIVE for chest pain, palpitations or peripheral edema    Patient Active Problem List    Diagnosis Date Noted     Hemorrhoid 03/24/2022     Priority: Medium     Erectile dysfunction 03/24/2022     Priority: Medium     Hyperlipidemia 03/24/2022     Priority: Medium     Hypertension 03/24/2022     Priority: Medium     Obesity 03/24/2022     Priority: Medium     Prostatitis 03/24/2022     Priority: Medium     Type 2 diabetes mellitus (H) 03/24/2022     Priority: Medium      Past Medical History:   Diagnosis Date     NO ACTIVE PROBLEMS      Past Surgical History:   Procedure Laterality Date     CATARACT EXTRACTION      no date     CATARACT EXTRACTION EXTRACAPSULAR W/ INTRAOCULAR LENS IMPLANTATION  02/26/2015     COLONOSCOPY  08/19/2013     COLONOSCOPY  09/11/2003     FLEXIBLE SIGMOIDOSCOPY  02/25/2000     LAPAROSCOPIC APPENDECTOMY  11/2012     NO HISTORY OF SURGERY       RETINAL DETACHMENT SURGERY      no date     TOOTH EXTRACTION      crown-no date     Current Outpatient Medications   Medication Sig Dispense Refill     aspirin 81 MG EC tablet Take 81 mg by mouth daily        atorvastatin (LIPITOR) 40 MG tablet Take 1 tablet (40 mg) by mouth daily 90 tablet 3     blood glucose (ACCU-CHEK GUIDE) test strip 1 strip by In Vitro route daily Use to test blood sugar one time daily. 100 strip 1     blood glucose (NO BRAND SPECIFIED) lancets standard Use to test blood sugar 1 times daily or as directed. accu chek soft clix lancets 100 each 1     lisinopril (ZESTRIL) 20 MG tablet Take 1 tablet (20 mg) by mouth daily 90 tablet 3     metFORMIN (GLUCOPHAGE) 500 MG tablet Take 2 tablets (1,000 mg) by mouth 2 times daily (with meals) 360 tablet 3     tadalafil (CIALIS) 20 MG tablet Take 1 tablet (20 mg) by mouth daily as needed (ED) 40 tablet 2       Allergies   Allergen Reactions     No Known Allergies         Social History     Tobacco Use     Smoking status: Former     Types: Cigarettes     Quit date: 1992     Years since quittin.9     Smokeless tobacco: Never   Substance Use Topics     Alcohol use: Not Currently       History   Drug Use Unknown         Objective     /74 (BP Location: Right arm, Patient Position: Sitting, Cuff Size: Adult Large)   Pulse 76   Temp 97.3  F (36.3  C) (Temporal)   Wt 81.2 kg (179 lb)   BMI 27.02 kg/m      Physical Exam  GENERAL APPEARANCE: healthy, alert and no distress  HENT: ear canals and TM's normal and nose and mouth without ulcers or lesions  RESP: lungs clear to auscultation - no rales, rhonchi or wheezes  CV: regular rate and rhythm, normal S1 S2, no S3 or S4 and no murmur, click or rub   ABDOMEN: soft, nontender, no HSM or masses and bowel sounds normal  NEURO: Normal strength and tone, sensory exam grossly normal, mentation intact and speech normal    Recent Labs   Lab Test 22  0837 22  0751 09/10/21  1548 21  0841   HGB  --   --   --  15.5   PLT  --   --   --  118*   NA  --  140  --  138   POTASSIUM  --  4.4  --  4.6   CR  --  0.91  --  0.97   A1C 5.5 8.5*   < > 10.3*    < > = values in this interval not displayed.         Diagnostics:  No labs were ordered during this visit.   No EKG required for low risk surgery (cataract, skin procedure, breast biopsy, etc).    Revised Cardiac Risk Index (RCRI):  The patient has the following serious cardiovascular risks for perioperative complications:   - No serious cardiac risks = 0 points     RCRI Interpretation: 0 points: Class I (very low risk - 0.4% complication rate)           Signed Electronically by: Jack Coto MD  Copy of this evaluation report is provided to requesting physician.

## 2023-02-06 ENCOUNTER — LAB (OUTPATIENT)
Dept: LAB | Facility: CLINIC | Age: 73
End: 2023-02-06
Payer: COMMERCIAL

## 2023-02-06 DIAGNOSIS — N52.9 ERECTILE DYSFUNCTION, UNSPECIFIED ERECTILE DYSFUNCTION TYPE: ICD-10-CM

## 2023-02-06 DIAGNOSIS — E11.9 TYPE 2 DIABETES MELLITUS WITHOUT COMPLICATION, WITHOUT LONG-TERM CURRENT USE OF INSULIN (H): ICD-10-CM

## 2023-02-06 DIAGNOSIS — I10 PRIMARY HYPERTENSION: ICD-10-CM

## 2023-02-06 DIAGNOSIS — E78.00 PURE HYPERCHOLESTEROLEMIA: ICD-10-CM

## 2023-02-06 DIAGNOSIS — Z11.59 NEED FOR HEPATITIS C SCREENING TEST: ICD-10-CM

## 2023-02-06 LAB
ANION GAP SERPL CALCULATED.3IONS-SCNC: 11 MMOL/L (ref 7–15)
BUN SERPL-MCNC: 26.7 MG/DL (ref 8–23)
CALCIUM SERPL-MCNC: 9.2 MG/DL (ref 8.8–10.2)
CHLORIDE SERPL-SCNC: 106 MMOL/L (ref 98–107)
CHOLEST SERPL-MCNC: 127 MG/DL
CREAT SERPL-MCNC: 0.94 MG/DL (ref 0.67–1.17)
CREAT UR-MCNC: 83.5 MG/DL
DEPRECATED HCO3 PLAS-SCNC: 25 MMOL/L (ref 22–29)
ERYTHROCYTE [DISTWIDTH] IN BLOOD BY AUTOMATED COUNT: 12.8 % (ref 10–15)
GFR SERPL CREATININE-BSD FRML MDRD: 86 ML/MIN/1.73M2
GLUCOSE SERPL-MCNC: 123 MG/DL (ref 70–99)
HBA1C MFR BLD: 6.2 % (ref 0–5.6)
HCT VFR BLD AUTO: 40.6 % (ref 40–53)
HDLC SERPL-MCNC: 43 MG/DL
HGB BLD-MCNC: 14.3 G/DL (ref 13.3–17.7)
LDLC SERPL CALC-MCNC: 69 MG/DL
MCH RBC QN AUTO: 31.1 PG (ref 26.5–33)
MCHC RBC AUTO-ENTMCNC: 35.2 G/DL (ref 31.5–36.5)
MCV RBC AUTO: 88 FL (ref 78–100)
MICROALBUMIN UR-MCNC: <12 MG/L
MICROALBUMIN/CREAT UR: NORMAL MG/G{CREAT}
NONHDLC SERPL-MCNC: 84 MG/DL
PLATELET # BLD AUTO: 174 10E3/UL (ref 150–450)
POTASSIUM SERPL-SCNC: 5.3 MMOL/L (ref 3.4–5.3)
RBC # BLD AUTO: 4.6 10E6/UL (ref 4.4–5.9)
SODIUM SERPL-SCNC: 142 MMOL/L (ref 136–145)
TRIGL SERPL-MCNC: 73 MG/DL
WBC # BLD AUTO: 5.1 10E3/UL (ref 4–11)

## 2023-02-06 PROCEDURE — 82043 UR ALBUMIN QUANTITATIVE: CPT

## 2023-02-06 PROCEDURE — 80061 LIPID PANEL: CPT

## 2023-02-06 PROCEDURE — 83036 HEMOGLOBIN GLYCOSYLATED A1C: CPT

## 2023-02-06 PROCEDURE — 82570 ASSAY OF URINE CREATININE: CPT

## 2023-02-06 PROCEDURE — 36415 COLL VENOUS BLD VENIPUNCTURE: CPT

## 2023-02-06 PROCEDURE — 85027 COMPLETE CBC AUTOMATED: CPT

## 2023-02-06 PROCEDURE — 80048 BASIC METABOLIC PNL TOTAL CA: CPT

## 2023-02-21 ENCOUNTER — OFFICE VISIT (OUTPATIENT)
Dept: FAMILY MEDICINE | Facility: CLINIC | Age: 73
End: 2023-02-21
Payer: COMMERCIAL

## 2023-02-21 VITALS
RESPIRATION RATE: 16 BRPM | OXYGEN SATURATION: 97 % | WEIGHT: 177 LBS | TEMPERATURE: 97.3 F | DIASTOLIC BLOOD PRESSURE: 68 MMHG | BODY MASS INDEX: 26.22 KG/M2 | HEIGHT: 69 IN | SYSTOLIC BLOOD PRESSURE: 122 MMHG | HEART RATE: 67 BPM

## 2023-02-21 DIAGNOSIS — Z00.00 ENCOUNTER FOR MEDICARE ANNUAL WELLNESS EXAM: Primary | ICD-10-CM

## 2023-02-21 DIAGNOSIS — I10 PRIMARY HYPERTENSION: ICD-10-CM

## 2023-02-21 DIAGNOSIS — E78.00 PURE HYPERCHOLESTEROLEMIA: ICD-10-CM

## 2023-02-21 DIAGNOSIS — E11.9 TYPE 2 DIABETES MELLITUS WITHOUT COMPLICATION, WITHOUT LONG-TERM CURRENT USE OF INSULIN (H): ICD-10-CM

## 2023-02-21 DIAGNOSIS — N52.9 ERECTILE DYSFUNCTION, UNSPECIFIED ERECTILE DYSFUNCTION TYPE: ICD-10-CM

## 2023-02-21 PROBLEM — H25.811 COMBINED FORMS OF AGE-RELATED CATARACT OF RIGHT EYE: Status: ACTIVE | Noted: 2022-11-22

## 2023-02-21 PROCEDURE — G0439 PPPS, SUBSEQ VISIT: HCPCS | Performed by: FAMILY MEDICINE

## 2023-02-21 PROCEDURE — 99214 OFFICE O/P EST MOD 30 MIN: CPT | Mod: 25 | Performed by: FAMILY MEDICINE

## 2023-02-21 RX ORDER — TADALAFIL 20 MG/1
20 TABLET ORAL DAILY PRN
Qty: 40 TABLET | Refills: 2 | Status: SHIPPED | OUTPATIENT
Start: 2023-02-21 | End: 2023-02-21

## 2023-02-21 RX ORDER — ATORVASTATIN CALCIUM 40 MG/1
40 TABLET, FILM COATED ORAL DAILY
Qty: 90 TABLET | Refills: 3 | Status: SHIPPED | OUTPATIENT
Start: 2023-04-01 | End: 2023-11-16

## 2023-02-21 RX ORDER — LISINOPRIL 20 MG/1
20 TABLET ORAL DAILY
Qty: 90 TABLET | Refills: 3 | Status: SHIPPED | OUTPATIENT
Start: 2023-04-01 | End: 2023-11-16

## 2023-02-21 RX ORDER — ATORVASTATIN CALCIUM 40 MG/1
40 TABLET, FILM COATED ORAL DAILY
Qty: 90 TABLET | Refills: 3 | Status: SHIPPED | OUTPATIENT
Start: 2023-02-21 | End: 2023-02-21

## 2023-02-21 RX ORDER — LISINOPRIL 20 MG/1
20 TABLET ORAL DAILY
Qty: 90 TABLET | Refills: 3 | Status: SHIPPED | OUTPATIENT
Start: 2023-02-21 | End: 2023-02-21

## 2023-02-21 RX ORDER — TADALAFIL 20 MG/1
20 TABLET ORAL DAILY PRN
Qty: 40 TABLET | Refills: 2 | Status: SHIPPED | OUTPATIENT
Start: 2023-04-01

## 2023-02-21 ASSESSMENT — ENCOUNTER SYMPTOMS
NERVOUS/ANXIOUS: 0
DIZZINESS: 0
EYE PAIN: 0
COUGH: 0
HEMATOCHEZIA: 0
FREQUENCY: 0
HEMATURIA: 0
FEVER: 0
CHILLS: 0
CONSTIPATION: 0
DIARRHEA: 0
ABDOMINAL PAIN: 0

## 2023-02-21 ASSESSMENT — ACTIVITIES OF DAILY LIVING (ADL): CURRENT_FUNCTION: NO ASSISTANCE NEEDED

## 2023-02-21 ASSESSMENT — PAIN SCALES - GENERAL: PAINLEVEL: NO PAIN (0)

## 2023-02-21 NOTE — PATIENT INSTRUCTIONS
Patient Education   Personalized Prevention Plan  You are due for the preventive services outlined below.  Your care team is available to assist you in scheduling these services.  If you have already completed any of these items, please share that information with your care team to update in your medical record.  Health Maintenance Due   Topic Date Due     AORTIC ANEURYSM SCREENING (SYSTEM ASSIGNED)  Never done     Annual Wellness Visit  06/06/2019

## 2023-02-21 NOTE — PROGRESS NOTES
"SUBJECTIVE:   Nabor is a 72 year old who presents for Preventive Visit.  Patient has been advised of split billing requirements and indicates understanding: Yes  Are you in the first 12 months of your Medicare coverage?  No    Healthy Habits:     In general, how would you rate your overall health?  Excellent    Frequency of exercise:  4-5 days/week    Duration of exercise:  45-60 minutes    Do you usually eat at least 4 servings of fruit and vegetables a day, include whole grains    & fiber and avoid regularly eating high fat or \"junk\" foods?  Yes    Taking medications regularly:  Yes    Medication side effects:  Not applicable    Ability to successfully perform activities of daily living:  No assistance needed    Home Safety:  No safety concerns identified    Hearing Impairment:  No hearing concerns    In the past 6 months, have you been bothered by leaking of urine?  No    In general, how would you rate your overall mental or emotional health?  Excellent      PHQ-2 Total Score: 0    Additional concerns today:  No      Have you ever done Advance Care Planning? (For example, a Health Directive, POLST, or a discussion with a medical provider or your loved ones about your wishes): Yes, patient states has an Advance Care Planning document and will bring a copy to the clinic.      Right Ear:      1000 Hz: RESPONSE- on Level: 40 db   2000 Hz: RESPONSE- on Level: 40 db   4000 Hz: RESPONSE- on Level: 40 db    Left Ear:      4000 Hz: RESPONSE- on Level: 40 db   2000 Hz: RESPONSE- on Level: 40 db   1000 Hz: RESPONSE- on Level: 40 db    500 Hz: RESPONSE- on Level: 40 db    Right Ear:    500 Hz: RESPONSE- on Level: 40 db    Hearing Acuity: Pass    Hearing Assessment: normal   Fall risk  Fallen 2 or more times in the past year?: No  Any fall with injury in the past year?: No    Cognitive Screening   1) Repeat 3 items (Leader, Season, Table)    2) Clock draw: NORMAL  3) 3 item recall: Recalls 3 objects  Results: 3 items " recalled: COGNITIVE IMPAIRMENT LESS LIKELY    Mini-CogTM Copyright SONIA Teran. Licensed by the author for use in [facility]; reprinted with permission ([email or web address]). All rights reserved.      Do you have sleep apnea, excessive snoring or daytime drowsiness?: no    Reviewed and updated as needed this visit by clinical staff   Tobacco  Allergies  Meds              Reviewed and updated as needed this visit by Provider                 Social History     Tobacco Use     Smoking status: Former     Types: Cigarettes     Quit date: 1992     Years since quittin.1     Smokeless tobacco: Never   Substance Use Topics     Alcohol use: Not Currently     If you drink alcohol do you typically have >3 drinks per day or >7 drinks per week? No    Alcohol Use 2023   Prescreen: >3 drinks/day or >7 drinks/week? Not Applicable           Diabetes Follow-up    How often are you checking your blood sugar? A few times a month  What time of day are you checking your blood sugars (select all that apply)?  Before and after meals  Have you had any blood sugars above 200?  No  Have you had any blood sugars below 70?  No    What symptoms do you notice when your blood sugar is low?  Shaky, Dizzy and Weak    What concerns do you have today about your diabetes? None     Do you have any of these symptoms? (Select all that apply)  No numbness or tingling in feet.  No redness, sores or blisters on feet.  No complaints of excessive thirst.  No reports of blurry vision.  No significant changes to weight.        Hyperlipidemia Follow-Up      Are you regularly taking any medication or supplement to lower your cholesterol?   Yes- statin    Are you having muscle aches or other side effects that you think could be caused by your cholesterol lowering medication?  No    Hypertension Follow-up      Do you check your blood pressure regularly outside of the clinic? Yes     Are you following a low salt diet? Yes    Are your blood  pressures ever more than 140 on the top number (systolic) OR more   than 90 on the bottom number (diastolic), for example 140/90? No    BP Readings from Last 2 Encounters:   02/21/23 122/68   11/25/22 116/74     Hemoglobin A1C (%)   Date Value   02/06/2023 6.2 (H)   09/07/2022 5.5     LDL Cholesterol Calculated (mg/dL)   Date Value   02/06/2023 69   05/11/2022 58         Current providers sharing in care for this patient include:   Patient Care Team:  Sree Arana MD as PCP - General (Family Medicine)  Sree Arana MD as Assigned PCP  Debby Peraza RD as Diabetes Educator    The following health maintenance items are reviewed in Epic and correct as of today:  Health Maintenance   Topic Date Due     AORTIC ANEURYSM SCREENING (SYSTEM ASSIGNED)  Never done     MEDICARE ANNUAL WELLNESS VISIT  06/06/2019     HEPATITIS C SCREENING  03/31/2023 (Originally 7/21/1968)     ZOSTER IMMUNIZATION (1 of 2) 03/31/2023 (Originally 7/21/1999)     A1C  05/06/2023     COLORECTAL CANCER SCREENING  08/19/2023     DIABETIC FOOT EXAM  09/15/2023     ANNUAL REVIEW OF HM ORDERS  09/15/2023     EYE EXAM  10/11/2023     BMP  02/06/2024     LIPID  02/06/2024     MICROALBUMIN  02/06/2024     FALL RISK ASSESSMENT  02/21/2024     ADVANCE CARE PLANNING  09/15/2027     DTAP/TDAP/TD IMMUNIZATION (7 - Td or Tdap) 10/28/2032     PHQ-2 (once per calendar year)  Completed     INFLUENZA VACCINE  Completed     Pneumococcal Vaccine: 65+ Years  Completed     COVID-19 Vaccine  Completed     IPV IMMUNIZATION  Aged Out     MENINGITIS IMMUNIZATION  Aged Out               Review of Systems   Constitutional: Negative for chills and fever.   HENT: Negative for congestion and ear pain.    Eyes: Negative for pain.   Respiratory: Negative for cough.    Cardiovascular: Negative for chest pain.   Gastrointestinal: Negative for abdominal pain, constipation, diarrhea and hematochezia.   Genitourinary: Negative for frequency, genital sores and  "hematuria.   Neurological: Negative for dizziness.   Psychiatric/Behavioral: The patient is not nervous/anxious.          OBJECTIVE:   /68 (BP Location: Right arm, Patient Position: Sitting)   Pulse 67   Temp 97.3  F (36.3  C) (Tympanic)   Resp 16   Ht 1.74 m (5' 8.5\")   Wt 80.3 kg (177 lb)   SpO2 97%   BMI 26.52 kg/m   Estimated body mass index is 26.52 kg/m  as calculated from the following:    Height as of this encounter: 1.74 m (5' 8.5\").    Weight as of this encounter: 80.3 kg (177 lb).  Physical Exam  GENERAL: healthy, alert and no distress  NECK: no adenopathy, no asymmetry, masses, or scars and thyroid normal to palpation  RESP: lungs clear to auscultation - no rales, rhonchi or wheezes  CV: regular rate and rhythm, normal S1 S2, no S3 or S4, no murmur, click or rub, no peripheral edema and peripheral pulses strong  ABDOMEN: soft, nontender, no hepatosplenomegaly, no masses and bowel sounds normal  MS: no gross musculoskeletal defects noted, no edema        ASSESSMENT / PLAN:       ICD-10-CM    1. Pure hypercholesterolemia Controlled by prescription medication prescribed by me and up to date.   E78.00 atorvastatin (LIPITOR) 40 MG tablet     DISCONTINUED: atorvastatin (LIPITOR) 40 MG tablet      2. Primary hypertension Controlled by prescription medication prescribed by me and up to date.   I10 lisinopril (ZESTRIL) 20 MG tablet     DISCONTINUED: lisinopril (ZESTRIL) 20 MG tablet      3. Type 2 diabetes mellitus without complication, without long-term current use of insulin (H) Controlled by prescription medication prescribed by me and up to date.   E11.9 metFORMIN (GLUCOPHAGE) 500 MG tablet     DISCONTINUED: metFORMIN (GLUCOPHAGE) 500 MG tablet      4. Erectile dysfunction, unspecified erectile dysfunction type  N52.9 tadalafil (CIALIS) 20 MG tablet     DISCONTINUED: tadalafil (CIALIS) 20 MG tablet          Patient has been advised of split billing requirements and indicates understanding: " Yes      COUNSELING:  Reviewed preventive health counseling, as reflected in patient instructions       Consider AAA screening for ages 65-75 and smoking history       Regular exercise       Healthy diet/nutrition       Vision screening        He reports that he quit smoking about 31 years ago. His smoking use included cigarettes. He has never used smokeless tobacco.      Appropriate preventive services were discussed with this patient, including applicable screening as appropriate for cardiovascular disease, diabetes, osteopenia/osteoporosis, and glaucoma.  As appropriate for age/gender, discussed screening for colorectal cancer, prostate cancer, breast cancer, and cervical cancer. Checklist reviewing preventive services available has been given to the patient.    Reviewed patients plan of care and provided an AVS. The Basic Care Plan (routine screening as documented in Health Maintenance) for Nabor meets the Care Plan requirement. This Care Plan has been established and reviewed with the Patient.      Sree Arana MD  [facility]    Identified Health Risks:

## 2023-08-09 ENCOUNTER — LAB (OUTPATIENT)
Dept: LAB | Facility: CLINIC | Age: 73
End: 2023-08-09
Payer: COMMERCIAL

## 2023-08-09 DIAGNOSIS — E11.9 TYPE 2 DIABETES MELLITUS WITHOUT COMPLICATION, WITHOUT LONG-TERM CURRENT USE OF INSULIN (H): ICD-10-CM

## 2023-08-09 DIAGNOSIS — Z11.59 NEED FOR HEPATITIS C SCREENING TEST: Primary | ICD-10-CM

## 2023-08-09 LAB
HBA1C MFR BLD: 5.7 % (ref 0–5.6)
HCV AB SERPL QL IA: NONREACTIVE

## 2023-08-09 PROCEDURE — 86803 HEPATITIS C AB TEST: CPT

## 2023-08-09 PROCEDURE — 83036 HEMOGLOBIN GLYCOSYLATED A1C: CPT

## 2023-08-09 PROCEDURE — 36415 COLL VENOUS BLD VENIPUNCTURE: CPT

## 2023-11-03 ENCOUNTER — TELEPHONE (OUTPATIENT)
Dept: FAMILY MEDICINE | Facility: CLINIC | Age: 73
End: 2023-11-03
Payer: COMMERCIAL

## 2023-11-03 NOTE — TELEPHONE ENCOUNTER
Called patient to schedule appointment. Patient decided to go to the ED at [facility] in [place]. Writer stated for patient to call back if he needs to schedule a follow-up appointment with Dr. Arana.

## 2023-11-03 NOTE — TELEPHONE ENCOUNTER
Reason for Call:  Other appointment    Detailed comments: pt wanting to be seen for rectal bleeding with medidametrics    Phone Number Patient can be reached at: Home number on file [phone] (home)    Best Time: any    Can we leave a detailed message on this number? YES    Call taken on 11/3/2023 at 11:38 AM by Nyo Ruelas

## 2023-11-08 ENCOUNTER — OFFICE VISIT (OUTPATIENT)
Dept: FAMILY MEDICINE | Facility: CLINIC | Age: 73
End: 2023-11-08
Payer: COMMERCIAL

## 2023-11-08 VITALS
SYSTOLIC BLOOD PRESSURE: 120 MMHG | OXYGEN SATURATION: 98 % | HEART RATE: 71 BPM | DIASTOLIC BLOOD PRESSURE: 70 MMHG | TEMPERATURE: 97.3 F | WEIGHT: 176.8 LBS | HEIGHT: 69 IN | BODY MASS INDEX: 26.19 KG/M2 | RESPIRATION RATE: 18 BRPM

## 2023-11-08 DIAGNOSIS — D62 ANEMIA DUE TO BLOOD LOSS, ACUTE: Primary | ICD-10-CM

## 2023-11-08 DIAGNOSIS — Z87.19 HISTORY OF LOWER GI BLEEDING: ICD-10-CM

## 2023-11-08 DIAGNOSIS — E11.9 TYPE 2 DIABETES MELLITUS WITHOUT COMPLICATION, WITHOUT LONG-TERM CURRENT USE OF INSULIN (H): ICD-10-CM

## 2023-11-08 DIAGNOSIS — H61.23 BILATERAL IMPACTED CERUMEN: ICD-10-CM

## 2023-11-08 LAB
ANION GAP SERPL CALCULATED.3IONS-SCNC: 14 MMOL/L (ref 7–15)
BASOPHILS # BLD AUTO: 0 10E3/UL (ref 0–0.2)
BASOPHILS NFR BLD AUTO: 1 %
BUN SERPL-MCNC: 23 MG/DL (ref 8–23)
CALCIUM SERPL-MCNC: 9.7 MG/DL (ref 8.8–10.2)
CHLORIDE SERPL-SCNC: 105 MMOL/L (ref 98–107)
CREAT SERPL-MCNC: 1.02 MG/DL (ref 0.67–1.17)
DEPRECATED HCO3 PLAS-SCNC: 24 MMOL/L (ref 22–29)
EGFRCR SERPLBLD CKD-EPI 2021: 77 ML/MIN/1.73M2
EOSINOPHIL # BLD AUTO: 0.1 10E3/UL (ref 0–0.7)
EOSINOPHIL NFR BLD AUTO: 1 %
ERYTHROCYTE [DISTWIDTH] IN BLOOD BY AUTOMATED COUNT: 13 % (ref 10–15)
GLUCOSE SERPL-MCNC: 106 MG/DL (ref 70–99)
HBA1C MFR BLD: 5.6 % (ref 0–5.6)
HCT VFR BLD AUTO: 32.9 % (ref 40–53)
HGB BLD-MCNC: 11.4 G/DL (ref 13.3–17.7)
IMM GRANULOCYTES # BLD: 0 10E3/UL
IMM GRANULOCYTES NFR BLD: 1 %
LYMPHOCYTES # BLD AUTO: 1.4 10E3/UL (ref 0.8–5.3)
LYMPHOCYTES NFR BLD AUTO: 22 %
MCH RBC QN AUTO: 31.6 PG (ref 26.5–33)
MCHC RBC AUTO-ENTMCNC: 34.7 G/DL (ref 31.5–36.5)
MCV RBC AUTO: 91 FL (ref 78–100)
MONOCYTES # BLD AUTO: 0.5 10E3/UL (ref 0–1.3)
MONOCYTES NFR BLD AUTO: 8 %
NEUTROPHILS # BLD AUTO: 4.4 10E3/UL (ref 1.6–8.3)
NEUTROPHILS NFR BLD AUTO: 68 %
PLATELET # BLD AUTO: 273 10E3/UL (ref 150–450)
POTASSIUM SERPL-SCNC: 4.6 MMOL/L (ref 3.4–5.3)
RBC # BLD AUTO: 3.61 10E6/UL (ref 4.4–5.9)
SODIUM SERPL-SCNC: 143 MMOL/L (ref 135–145)
WBC # BLD AUTO: 6.5 10E3/UL (ref 4–11)

## 2023-11-08 PROCEDURE — 99213 OFFICE O/P EST LOW 20 MIN: CPT | Mod: 25 | Performed by: PHYSICIAN ASSISTANT

## 2023-11-08 PROCEDURE — 80048 BASIC METABOLIC PNL TOTAL CA: CPT | Performed by: PHYSICIAN ASSISTANT

## 2023-11-08 PROCEDURE — 83036 HEMOGLOBIN GLYCOSYLATED A1C: CPT | Performed by: PHYSICIAN ASSISTANT

## 2023-11-08 PROCEDURE — 69210 REMOVE IMPACTED EAR WAX UNI: CPT | Performed by: PHYSICIAN ASSISTANT

## 2023-11-08 PROCEDURE — 36415 COLL VENOUS BLD VENIPUNCTURE: CPT | Performed by: PHYSICIAN ASSISTANT

## 2023-11-08 PROCEDURE — 85025 COMPLETE CBC W/AUTO DIFF WBC: CPT | Mod: QW | Performed by: PHYSICIAN ASSISTANT

## 2023-11-08 ASSESSMENT — ENCOUNTER SYMPTOMS
GASTROINTESTINAL NEGATIVE: 1
CONSTITUTIONAL NEGATIVE: 1

## 2023-11-08 NOTE — LETTER
November 8, 2023      Nabor Lin  [address]   [facility] [address]        Dear ,    We are writing to inform you of your test results.    As we discussed, your hemoglobin is improving.  Your chemistries are unremarkable.    Resulted Orders   Basic metabolic panel  (Ca, Cl, CO2, Creat, Gluc, K, Na, BUN)   Result Value Ref Range    Sodium 143 135 - 145 mmol/L      Comment:      Reference intervals for this test were updated on 09/26/2023 to more accurately reflect our healthy population. There may be differences in the flagging of prior results with similar values performed with this method. Interpretation of those prior results can be made in the context of the updated reference intervals.     Potassium 4.6 3.4 - 5.3 mmol/L    Chloride 105 98 - 107 mmol/L    Carbon Dioxide (CO2) 24 22 - 29 mmol/L    Anion Gap 14 7 - 15 mmol/L    Urea Nitrogen 23.0 8.0 - 23.0 mg/dL    Creatinine 1.02 0.67 - 1.17 mg/dL    GFR Estimate 77 >60 mL/min/1.73m2    Calcium 9.7 8.8 - 10.2 mg/dL    Glucose 106 (H) 70 - 99 mg/dL   CBC with platelets and differential   Result Value Ref Range    WBC Count 6.5 4.0 - 11.0 10e3/uL    RBC Count 3.61 (L) 4.40 - 5.90 10e6/uL    Hemoglobin 11.4 (L) 13.3 - 17.7 g/dL    Hematocrit 32.9 (L) 40.0 - 53.0 %    MCV 91 78 - 100 fL    MCH 31.6 26.5 - 33.0 pg    MCHC 34.7 31.5 - 36.5 g/dL    RDW 13.0 10.0 - 15.0 %    Platelet Count 273 150 - 450 10e3/uL    % Neutrophils 68 %    % Lymphocytes 22 %    % Monocytes 8 %    % Eosinophils 1 %    % Basophils 1 %    % Immature Granulocytes 1 %    Absolute Neutrophils 4.4 1.6 - 8.3 10e3/uL    Absolute Lymphocytes 1.4 0.8 - 5.3 10e3/uL    Absolute Monocytes 0.5 0.0 - 1.3 10e3/uL    Absolute Eosinophils 0.1 0.0 - 0.7 10e3/uL    Absolute Basophils 0.0 0.0 - 0.2 10e3/uL    Absolute Immature Granulocytes 0.0 <=0.4 10e3/uL       If you have any questions or concerns, please call the clinic at the number listed above.       Sincerely,      Germain Cortes,  PA

## 2023-11-08 NOTE — PROGRESS NOTES
"  Assessment & Plan     Anemia due to blood loss, acute  Labs pending  - Basic metabolic panel  (Ca, Cl, CO2, Creat, Gluc, K, Na, BUN)  - CBC with platelets and differential    History of lower GI bleeding  Follow with GI as scheduled    Bilateral impacted cerumen  Resolved follow-up as needed  Scalp laceration. Remove staples next week.             MED REC REQUIRED  Post Medication Reconciliation Status:   BMI:   Estimated body mass index is 26.49 kg/m  as calculated from the following:    Height as of this encounter: 1.74 m (5' 8.5\").    Weight as of this encounter: 80.2 kg (176 lb 12.8 oz).           BISHOP Horne  [facility]    Fela Tomlin is a 73 year old, presenting for the following health issues:  Hospital F/U (11/3/23-11/5/23 [facility] for lower acute GI bleed and laceration to scalp/)        11/8/2023     9:42 AM   Additional Questions   Roomed by CORRIE Santiago       73-year-old male presents to the clinic to follow-up for recent hospitalization for lower GI bleed  It was thought that his blood was diverticular in nature from a ruptured diverticulum  He also had a syncopal episode fell and had a scalp laceration  He has no dark stools  He has no abdominal pain  Gastroenterologist told him he needs a colonoscopy but can be deferred until after the first of the year when they will provide this service in [place]  No further lightheadedness  No chest pain or shortness of breath             Hospital Follow-up Visit:    Hospital/Nursing Home/IP Rehab Facility:  [facility]  Date of Admission: 11/3/23  Date of Discharge: 11/5/23  Reason(s) for Admission: lower acute GI bleed, laceration to scalp    Was your hospitalization related to COVID-19? No   Problems taking medications regularly:  None  Medication changes since discharge: stop aspirin for 2 weeks   Problems adhering to non-medication therapy:  no    Summary of hospitalization:  CareEverywhere information " "obtained and reviewed  Diagnostic Tests/Treatments reviewed.  Follow up needed: Labs and follow-up with GI  Other Healthcare Providers Involved in Patient s Care:         Procedures  Update since discharge: improved.         Plan of care communicated with patient                   Review of Systems   Constitutional: Negative.    Gastrointestinal: Negative.             Objective    /70 (BP Location: Right arm, Patient Position: Sitting, Cuff Size: Adult Regular)   Pulse 71   Temp 97.3  F (36.3  C) (Tympanic)   Resp 18   Ht 1.74 m (5' 8.5\")   Wt 80.2 kg (176 lb 12.8 oz)   SpO2 98%   BMI 26.49 kg/m    Body mass index is 26.49 kg/m .  Physical Exam ear canals occluded with cerumen bilaterally lavaged and curetted with good results afterwards canals and drums normal  Lungs clear well ventilated  Cardiovascular gait and rhythm  Scalp laceration healing without sign of infection                        "

## 2023-11-08 NOTE — LETTER
November 8, 2023      Nabor Lin  [address]   [facility] [address]        Dear ,    We are writing to inform you of your test results.    Your A1C looks great!  We will see you on the 16th.    Resulted Orders   Hemoglobin A1c   Result Value Ref Range    Hemoglobin A1C 5.6 0.0 - 5.6 %      Comment:      Normal <5.7%   Prediabetes 5.7-6.4%    Diabetes 6.5% or higher     Note: Adopted from ADA consensus guidelines.   Basic metabolic panel  (Ca, Cl, CO2, Creat, Gluc, K, Na, BUN)   Result Value Ref Range    Sodium 143 135 - 145 mmol/L      Comment:      Reference intervals for this test were updated on 09/26/2023 to more accurately reflect our healthy population. There may be differences in the flagging of prior results with similar values performed with this method. Interpretation of those prior results can be made in the context of the updated reference intervals.     Potassium 4.6 3.4 - 5.3 mmol/L    Chloride 105 98 - 107 mmol/L    Carbon Dioxide (CO2) 24 22 - 29 mmol/L    Anion Gap 14 7 - 15 mmol/L    Urea Nitrogen 23.0 8.0 - 23.0 mg/dL    Creatinine 1.02 0.67 - 1.17 mg/dL    GFR Estimate 77 >60 mL/min/1.73m2    Calcium 9.7 8.8 - 10.2 mg/dL    Glucose 106 (H) 70 - 99 mg/dL   CBC with platelets and differential   Result Value Ref Range    WBC Count 6.5 4.0 - 11.0 10e3/uL    RBC Count 3.61 (L) 4.40 - 5.90 10e6/uL    Hemoglobin 11.4 (L) 13.3 - 17.7 g/dL    Hematocrit 32.9 (L) 40.0 - 53.0 %    MCV 91 78 - 100 fL    MCH 31.6 26.5 - 33.0 pg    MCHC 34.7 31.5 - 36.5 g/dL    RDW 13.0 10.0 - 15.0 %    Platelet Count 273 150 - 450 10e3/uL    % Neutrophils 68 %    % Lymphocytes 22 %    % Monocytes 8 %    % Eosinophils 1 %    % Basophils 1 %    % Immature Granulocytes 1 %    Absolute Neutrophils 4.4 1.6 - 8.3 10e3/uL    Absolute Lymphocytes 1.4 0.8 - 5.3 10e3/uL    Absolute Monocytes 0.5 0.0 - 1.3 10e3/uL    Absolute Eosinophils 0.1 0.0 - 0.7 10e3/uL    Absolute Basophils 0.0 0.0 - 0.2 10e3/uL    Absolute  Immature Granulocytes 0.0 <=0.4 10e3/uL       If you have any questions or concerns, please call the clinic at the number listed above.       Sincerely,      Sree Arana MD

## 2023-11-16 ENCOUNTER — OFFICE VISIT (OUTPATIENT)
Dept: FAMILY MEDICINE | Facility: CLINIC | Age: 73
End: 2023-11-16
Payer: COMMERCIAL

## 2023-11-16 VITALS
HEART RATE: 62 BPM | OXYGEN SATURATION: 96 % | TEMPERATURE: 97.7 F | HEIGHT: 69 IN | RESPIRATION RATE: 16 BRPM | DIASTOLIC BLOOD PRESSURE: 64 MMHG | WEIGHT: 180 LBS | BODY MASS INDEX: 26.66 KG/M2 | SYSTOLIC BLOOD PRESSURE: 120 MMHG

## 2023-11-16 DIAGNOSIS — K92.2 ACUTE LOWER GI BLEEDING: ICD-10-CM

## 2023-11-16 DIAGNOSIS — I10 PRIMARY HYPERTENSION: ICD-10-CM

## 2023-11-16 DIAGNOSIS — Z23 NEED FOR SHINGLES VACCINE: ICD-10-CM

## 2023-11-16 DIAGNOSIS — E11.9 TYPE 2 DIABETES MELLITUS WITHOUT COMPLICATION, WITHOUT LONG-TERM CURRENT USE OF INSULIN (H): ICD-10-CM

## 2023-11-16 DIAGNOSIS — E78.00 PURE HYPERCHOLESTEROLEMIA: ICD-10-CM

## 2023-11-16 DIAGNOSIS — Z12.11 SCREEN FOR COLON CANCER: Primary | ICD-10-CM

## 2023-11-16 DIAGNOSIS — Z29.11 NEED FOR VACCINATION AGAINST RESPIRATORY SYNCYTIAL VIRUS: ICD-10-CM

## 2023-11-16 PROBLEM — S01.01XA SCALP LACERATION: Status: ACTIVE | Noted: 2023-11-04

## 2023-11-16 LAB
ANION GAP SERPL CALCULATED.3IONS-SCNC: 12 MMOL/L (ref 7–15)
BUN SERPL-MCNC: 27.1 MG/DL (ref 8–23)
CALCIUM SERPL-MCNC: 9.4 MG/DL (ref 8.8–10.2)
CHLORIDE SERPL-SCNC: 107 MMOL/L (ref 98–107)
CREAT SERPL-MCNC: 1.12 MG/DL (ref 0.67–1.17)
DEPRECATED HCO3 PLAS-SCNC: 24 MMOL/L (ref 22–29)
EGFRCR SERPLBLD CKD-EPI 2021: 69 ML/MIN/1.73M2
ERYTHROCYTE [DISTWIDTH] IN BLOOD BY AUTOMATED COUNT: 12.7 % (ref 10–15)
GLUCOSE SERPL-MCNC: 113 MG/DL (ref 70–99)
HCT VFR BLD AUTO: 32.3 % (ref 40–53)
HGB BLD-MCNC: 10.7 G/DL (ref 13.3–17.7)
MCH RBC QN AUTO: 30.1 PG (ref 26.5–33)
MCHC RBC AUTO-ENTMCNC: 33.1 G/DL (ref 31.5–36.5)
MCV RBC AUTO: 91 FL (ref 78–100)
PLATELET # BLD AUTO: 231 10E3/UL (ref 150–450)
POTASSIUM SERPL-SCNC: 5.6 MMOL/L (ref 3.4–5.3)
RBC # BLD AUTO: 3.56 10E6/UL (ref 4.4–5.9)
SODIUM SERPL-SCNC: 143 MMOL/L (ref 135–145)
WBC # BLD AUTO: 6.7 10E3/UL (ref 4–11)

## 2023-11-16 PROCEDURE — 36415 COLL VENOUS BLD VENIPUNCTURE: CPT | Performed by: FAMILY MEDICINE

## 2023-11-16 PROCEDURE — 99214 OFFICE O/P EST MOD 30 MIN: CPT | Performed by: FAMILY MEDICINE

## 2023-11-16 PROCEDURE — 85027 COMPLETE CBC AUTOMATED: CPT | Performed by: FAMILY MEDICINE

## 2023-11-16 PROCEDURE — 80048 BASIC METABOLIC PNL TOTAL CA: CPT | Performed by: FAMILY MEDICINE

## 2023-11-16 RX ORDER — RESPIRATORY SYNCYTIAL VIRUS VACCINE 120MCG/0.5
0.5 KIT INTRAMUSCULAR ONCE
Qty: 1 EACH | Refills: 0 | Status: SHIPPED | OUTPATIENT
Start: 2023-11-16 | End: 2023-11-16

## 2023-11-16 RX ORDER — ATORVASTATIN CALCIUM 40 MG/1
40 TABLET, FILM COATED ORAL DAILY
Qty: 90 TABLET | Refills: 3 | Status: SHIPPED | OUTPATIENT
Start: 2023-11-16

## 2023-11-16 RX ORDER — LISINOPRIL 20 MG/1
20 TABLET ORAL DAILY
Qty: 90 TABLET | Refills: 3 | Status: SHIPPED | OUTPATIENT
Start: 2023-11-16

## 2023-11-16 NOTE — PROGRESS NOTES
"   Assessment & Plan     Need for shingles vaccine  - zoster vaccine recombinant adjuvanted (SHINGRIX) injection; Inject 0.5 mLs into the muscle once for 1 dose Pharmacist administered    Need for vaccination against respiratory syncytial virus  - respiratory syncytial virus vaccine, bivalent (ABRYSVO) injection; Inject 0.5 mLs into the muscle once for 1 dose    Screen for colon cancer  - Colonoscopy Screening  Referral; Future    Type 2 diabetes mellitus without complication, without long-term current use of insulin (H)  Stable, Controlled by prescription medication prescribed by me and up to date.  - metFORMIN (GLUCOPHAGE) 500 MG tablet; Take 2 tablets (1,000 mg) by mouth 2 times daily (with meals)    Acute lower GI bleeding  Stable, Controlled by prescription medication prescribed by me and up to date.  - Basic metabolic panel  (Ca, Cl, CO2, Creat, Gluc, K, Na, BUN); Future  - CBC with platelets; Future  - Basic metabolic panel  (Ca, Cl, CO2, Creat, Gluc, K, Na, BUN)  - CBC with platelets    Pure hypercholesterolemia  Stable, Controlled by prescription medication prescribed by me and up to date.  - atorvastatin (LIPITOR) 40 MG tablet; Take 1 tablet (40 mg) by mouth daily    Primary hypertension  Stable, Controlled by prescription medication prescribed by me and up to date.  - lisinopril (ZESTRIL) 20 MG tablet; Take 1 tablet (20 mg) by mouth daily        BMI:   Estimated body mass index is 26.97 kg/m  as calculated from the following:    Height as of this encounter: 1.74 m (5' 8.5\").    Weight as of this encounter: 81.6 kg (180 lb).           rSee Arana MD  [facility]    Fela Tomlin is a 73 year old, presenting for the following health issues:  Follow Up (GI bleed and staple removal on scalp)      11/16/2023    10:39 AM   Additional Questions   Roomed by srud   Accompanied by n/a       History of Present Illness       Reason for visit:  Remove stitches    He " eats 2-3 servings of fruits and vegetables daily.He consumes 0 sweetened beverage(s) daily.He exercises with enough effort to increase his heart rate 30 to 60 minutes per day.  He exercises with enough effort to increase his heart rate 6 days per week.   He is taking medications regularly.       Diabetes Follow-up    How often are you checking your blood sugar? A few times a month  What time of day are you checking your blood sugars (select all that apply)?  Before and after meals  Have you had any blood sugars above 200?  No  Have you had any blood sugars below 70?  No  What symptoms do you notice when your blood sugar is low?  Shaky, Dizzy, and Weak  What concerns do you have today about your diabetes? None   Do you have any of these symptoms? (Select all that apply)  No numbness or tingling in feet.  No redness, sores or blisters on feet.  No complaints of excessive thirst.  No reports of blurry vision.  No significant changes to weight.  Have you had a diabetic eye exam in the last 12 months? No            Hyperlipidemia Follow-Up    Are you regularly taking any medication or supplement to lower your cholesterol?   Yes- statin  Are you having muscle aches or other side effects that you think could be caused by your cholesterol lowering medication?  No    Hypertension Follow-up    Do you check your blood pressure regularly outside of the clinic? Yes   Are you following a low salt diet? Yes  Are your blood pressures ever more than 140 on the top number (systolic) OR more   than 90 on the bottom number (diastolic), for example 140/90? No    BP Readings from Last 2 Encounters:   11/16/23 120/64   11/08/23 120/70     Hemoglobin A1C (%)   Date Value   11/08/2023 5.6   08/09/2023 5.7 (H)     LDL Cholesterol Calculated (mg/dL)   Date Value   02/06/2023 69   05/11/2022 58               Review of Systems   Constitutional, HEENT, cardiovascular, pulmonary, gi and gu systems are negative, except as otherwise noted.     "  Objective    /64 (BP Location: Right arm, Patient Position: Sitting)   Pulse 62   Temp 97.7  F (36.5  C) (Tympanic)   Resp 16   Ht 1.74 m (5' 8.5\")   Wt 81.6 kg (180 lb)   SpO2 96%   BMI 26.97 kg/m    Body mass index is 26.97 kg/m .  Physical Exam   GENERAL: healthy, alert and no distress  NECK: no adenopathy, no asymmetry, masses, or scars and thyroid normal to palpation  RESP: lungs clear to auscultation - no rales, rhonchi or wheezes  CV: regular rate and rhythm, normal S1 S2, no S3 or S4, no murmur, click or rub, no peripheral edema and peripheral pulses strong  ABDOMEN: soft, nontender, no hepatosplenomegaly, no masses and bowel sounds normal  MS: no gross musculoskeletal defects noted, no edema  SKIN: laceration scalp, staples removed    Recent Results (from the past 240 hour(s))   Hemoglobin A1c    Collection Time: 11/08/23 10:14 AM   Result Value Ref Range    Hemoglobin A1C 5.6 0.0 - 5.6 %   Basic metabolic panel  (Ca, Cl, CO2, Creat, Gluc, K, Na, BUN)    Collection Time: 11/08/23 10:14 AM   Result Value Ref Range    Sodium 143 135 - 145 mmol/L    Potassium 4.6 3.4 - 5.3 mmol/L    Chloride 105 98 - 107 mmol/L    Carbon Dioxide (CO2) 24 22 - 29 mmol/L    Anion Gap 14 7 - 15 mmol/L    Urea Nitrogen 23.0 8.0 - 23.0 mg/dL    Creatinine 1.02 0.67 - 1.17 mg/dL    GFR Estimate 77 >60 mL/min/1.73m2    Calcium 9.7 8.8 - 10.2 mg/dL    Glucose 106 (H) 70 - 99 mg/dL   CBC with platelets and differential    Collection Time: 11/08/23 10:14 AM   Result Value Ref Range    WBC Count 6.5 4.0 - 11.0 10e3/uL    RBC Count 3.61 (L) 4.40 - 5.90 10e6/uL    Hemoglobin 11.4 (L) 13.3 - 17.7 g/dL    Hematocrit 32.9 (L) 40.0 - 53.0 %    MCV 91 78 - 100 fL    MCH 31.6 26.5 - 33.0 pg    MCHC 34.7 31.5 - 36.5 g/dL    RDW 13.0 10.0 - 15.0 %    Platelet Count 273 150 - 450 10e3/uL    % Neutrophils 68 %    % Lymphocytes 22 %    % Monocytes 8 %    % Eosinophils 1 %    % Basophils 1 %    % Immature Granulocytes 1 %    Absolute " Neutrophils 4.4 1.6 - 8.3 10e3/uL    Absolute Lymphocytes 1.4 0.8 - 5.3 10e3/uL    Absolute Monocytes 0.5 0.0 - 1.3 10e3/uL    Absolute Eosinophils 0.1 0.0 - 0.7 10e3/uL    Absolute Basophils 0.0 0.0 - 0.2 10e3/uL    Absolute Immature Granulocytes 0.0 <=0.4 10e3/uL   CBC with platelets    Collection Time: 11/16/23 11:11 AM   Result Value Ref Range    WBC Count 6.7 4.0 - 11.0 10e3/uL    RBC Count 3.56 (L) 4.40 - 5.90 10e6/uL    Hemoglobin 10.7 (L) 13.3 - 17.7 g/dL    Hematocrit 32.3 (L) 40.0 - 53.0 %    MCV 91 78 - 100 fL    MCH 30.1 26.5 - 33.0 pg    MCHC 33.1 31.5 - 36.5 g/dL    RDW 12.7 10.0 - 15.0 %    Platelet Count 231 150 - 450 10e3/uL

## 2023-11-16 NOTE — LETTER
November 16, 2023      Nabor Lin  [address]   [facility] [id number]        Dear ,    We are writing to inform you of your test results.    Test results indicate you may require additional follow up, see comment below.  Please repeat your hemoglobin and potassium in a week.    Resulted Orders   Basic metabolic panel  (Ca, Cl, CO2, Creat, Gluc, K, Na, BUN)   Result Value Ref Range    Sodium 143 135 - 145 mmol/L      Comment:      Reference intervals for this test were updated on 09/26/2023 to more accurately reflect our healthy population. There may be differences in the flagging of prior results with similar values performed with this method. Interpretation of those prior results can be made in the context of the updated reference intervals.     Potassium 5.6 (H) 3.4 - 5.3 mmol/L    Chloride 107 98 - 107 mmol/L    Carbon Dioxide (CO2) 24 22 - 29 mmol/L    Anion Gap 12 7 - 15 mmol/L    Urea Nitrogen 27.1 (H) 8.0 - 23.0 mg/dL    Creatinine 1.12 0.67 - 1.17 mg/dL    GFR Estimate 69 >60 mL/min/1.73m2    Calcium 9.4 8.8 - 10.2 mg/dL    Glucose 113 (H) 70 - 99 mg/dL   CBC with platelets   Result Value Ref Range    WBC Count 6.7 4.0 - 11.0 10e3/uL    RBC Count 3.56 (L) 4.40 - 5.90 10e6/uL    Hemoglobin 10.7 (L) 13.3 - 17.7 g/dL    Hematocrit 32.3 (L) 40.0 - 53.0 %    MCV 91 78 - 100 fL    MCH 30.1 26.5 - 33.0 pg    MCHC 33.1 31.5 - 36.5 g/dL    RDW 12.7 10.0 - 15.0 %    Platelet Count 231 150 - 450 10e3/uL       If you have any questions or concerns, please call the clinic at the number listed above.       Sincerely,      Sree Arana MD

## 2023-11-27 ENCOUNTER — DOCUMENTATION ONLY (OUTPATIENT)
Dept: FAMILY MEDICINE | Facility: CLINIC | Age: 73
End: 2023-11-27
Payer: COMMERCIAL

## 2023-11-27 DIAGNOSIS — I10 PRIMARY HYPERTENSION: Primary | ICD-10-CM

## 2023-11-27 NOTE — PROGRESS NOTES
Patient has upcoming lab appointment and mentioned hemoglobin and K. Please place orders if tests are needed. Thanks.

## 2023-11-29 ENCOUNTER — LAB (OUTPATIENT)
Dept: LAB | Facility: CLINIC | Age: 73
End: 2023-11-29
Payer: COMMERCIAL

## 2023-11-29 DIAGNOSIS — I10 PRIMARY HYPERTENSION: ICD-10-CM

## 2023-11-29 LAB
ERYTHROCYTE [DISTWIDTH] IN BLOOD BY AUTOMATED COUNT: 13.3 % (ref 10–15)
HCT VFR BLD AUTO: 35 % (ref 40–53)
HGB BLD-MCNC: 11.4 G/DL (ref 13.3–17.7)
MCH RBC QN AUTO: 29.5 PG (ref 26.5–33)
MCHC RBC AUTO-ENTMCNC: 32.6 G/DL (ref 31.5–36.5)
MCV RBC AUTO: 91 FL (ref 78–100)
PLATELET # BLD AUTO: 175 10E3/UL (ref 150–450)
POTASSIUM SERPL-SCNC: 4.6 MMOL/L (ref 3.4–5.3)
RBC # BLD AUTO: 3.86 10E6/UL (ref 4.4–5.9)
WBC # BLD AUTO: 6.2 10E3/UL (ref 4–11)

## 2023-11-29 PROCEDURE — 36415 COLL VENOUS BLD VENIPUNCTURE: CPT

## 2023-11-29 PROCEDURE — 84132 ASSAY OF SERUM POTASSIUM: CPT

## 2023-11-29 PROCEDURE — 85027 COMPLETE CBC AUTOMATED: CPT

## 2024-02-02 ENCOUNTER — TRANSFERRED RECORDS (OUTPATIENT)
Dept: HEALTH INFORMATION MANAGEMENT | Facility: CLINIC | Age: 74
End: 2024-02-02

## 2024-02-05 ENCOUNTER — TRANSFERRED RECORDS (OUTPATIENT)
Dept: HEALTH INFORMATION MANAGEMENT | Facility: CLINIC | Age: 74
End: 2024-02-05

## 2024-04-15 ENCOUNTER — TRANSFERRED RECORDS (OUTPATIENT)
Dept: HEALTH INFORMATION MANAGEMENT | Facility: CLINIC | Age: 74
End: 2024-04-15
Payer: COMMERCIAL

## 2024-04-15 LAB — RETINOPATHY: NEGATIVE

## 2024-05-17 ENCOUNTER — LAB (OUTPATIENT)
Dept: LAB | Facility: CLINIC | Age: 74
End: 2024-05-17
Payer: COMMERCIAL

## 2024-05-17 DIAGNOSIS — E11.9 TYPE 2 DIABETES MELLITUS (H): Primary | ICD-10-CM

## 2024-05-17 LAB — HBA1C MFR BLD: 5.6 % (ref 0–5.6)

## 2024-05-17 PROCEDURE — 83036 HEMOGLOBIN GLYCOSYLATED A1C: CPT

## 2024-05-17 PROCEDURE — 36415 COLL VENOUS BLD VENIPUNCTURE: CPT

## 2024-05-17 PROCEDURE — 82043 UR ALBUMIN QUANTITATIVE: CPT

## 2024-05-17 PROCEDURE — 80061 LIPID PANEL: CPT

## 2024-05-17 PROCEDURE — 82570 ASSAY OF URINE CREATININE: CPT

## 2024-05-18 LAB
CHOLEST SERPL-MCNC: 100 MG/DL
CREAT UR-MCNC: 132 MG/DL
FASTING STATUS PATIENT QL REPORTED: YES
HDLC SERPL-MCNC: 48 MG/DL
LDLC SERPL CALC-MCNC: 41 MG/DL
MICROALBUMIN UR-MCNC: 15.3 MG/L
MICROALBUMIN/CREAT UR: 11.59 MG/G CR (ref 0–17)
NONHDLC SERPL-MCNC: 52 MG/DL
TRIGL SERPL-MCNC: 54 MG/DL

## 2024-10-13 ENCOUNTER — TRANSFERRED RECORDS (OUTPATIENT)
Dept: MULTI SPECIALTY CLINIC | Facility: CLINIC | Age: 74
End: 2024-10-13

## 2024-11-14 ENCOUNTER — LAB (OUTPATIENT)
Dept: LAB | Facility: CLINIC | Age: 74
End: 2024-11-14
Payer: COMMERCIAL

## 2024-11-14 DIAGNOSIS — E11.9 TYPE 2 DIABETES MELLITUS (H): Primary | ICD-10-CM

## 2024-11-14 DIAGNOSIS — I10 HYPERTENSION: ICD-10-CM

## 2024-11-14 LAB
ANION GAP SERPL CALCULATED.3IONS-SCNC: 11 MMOL/L (ref 7–15)
BUN SERPL-MCNC: 24.2 MG/DL (ref 8–23)
CALCIUM SERPL-MCNC: 9.1 MG/DL (ref 8.8–10.4)
CHLORIDE SERPL-SCNC: 106 MMOL/L (ref 98–107)
CREAT SERPL-MCNC: 1 MG/DL (ref 0.67–1.17)
EGFRCR SERPLBLD CKD-EPI 2021: 78 ML/MIN/1.73M2
EST. AVERAGE GLUCOSE BLD GHB EST-MCNC: 123 MG/DL
GLUCOSE SERPL-MCNC: 127 MG/DL (ref 70–99)
HBA1C MFR BLD: 5.9 % (ref 0–5.6)
HCO3 SERPL-SCNC: 24 MMOL/L (ref 22–29)
POTASSIUM SERPL-SCNC: 4.4 MMOL/L (ref 3.4–5.3)
SODIUM SERPL-SCNC: 141 MMOL/L (ref 135–145)

## 2024-11-14 PROCEDURE — 83036 HEMOGLOBIN GLYCOSYLATED A1C: CPT

## 2024-11-14 PROCEDURE — 36415 COLL VENOUS BLD VENIPUNCTURE: CPT

## 2024-11-14 PROCEDURE — 80048 BASIC METABOLIC PNL TOTAL CA: CPT

## 2024-11-25 ENCOUNTER — LAB (OUTPATIENT)
Dept: LAB | Facility: CLINIC | Age: 74
End: 2024-11-25
Payer: COMMERCIAL

## 2024-11-25 DIAGNOSIS — Z12.5 SCREENING FOR PROSTATE CANCER: ICD-10-CM

## 2024-11-25 LAB — PSA SERPL DL<=0.01 NG/ML-MCNC: 1.88 NG/ML (ref 0–6.5)

## 2024-11-25 PROCEDURE — 36415 COLL VENOUS BLD VENIPUNCTURE: CPT

## 2024-11-25 PROCEDURE — G0103 PSA SCREENING: HCPCS

## 2025-02-19 ENCOUNTER — TELEPHONE (OUTPATIENT)
Dept: FAMILY MEDICINE | Facility: CLINIC | Age: 75
End: 2025-02-19
Payer: COMMERCIAL

## 2025-02-19 NOTE — TELEPHONE ENCOUNTER
Patient Quality Outreach    Patient is due for the following:   Diabetes -  A1C  Physical Annual Wellness Visit    Action(s) Taken:   Patient was scheduled for 05/16/2025 @ 1010 am    Type of outreach:    Phone, spoke to patient/parent.      Questions for provider review:    None           Sapphire Francis MA

## 2025-04-14 ENCOUNTER — TELEPHONE (OUTPATIENT)
Dept: FAMILY MEDICINE | Facility: CLINIC | Age: 75
End: 2025-04-14
Payer: COMMERCIAL

## 2025-04-14 NOTE — TELEPHONE ENCOUNTER
Patient Quality Outreach    Patient is due for the following:   Physical Preventive Adult Physical    Action(s) Taken:   Patient was scheduled for 5/16/2025    Type of outreach:    Chart review performed, no outreach needed.    Questions for provider review:    None         Eva Altamirano  Chart routed to None.

## 2025-04-16 ENCOUNTER — TRANSFERRED RECORDS (OUTPATIENT)
Dept: HEALTH INFORMATION MANAGEMENT | Facility: CLINIC | Age: 75
End: 2025-04-16
Payer: COMMERCIAL

## 2025-04-21 ENCOUNTER — DOCUMENTATION ONLY (OUTPATIENT)
Dept: FAMILY MEDICINE | Facility: CLINIC | Age: 75
End: 2025-04-21
Payer: COMMERCIAL

## 2025-04-21 DIAGNOSIS — E78.00 PURE HYPERCHOLESTEROLEMIA: ICD-10-CM

## 2025-04-21 DIAGNOSIS — I10 PRIMARY HYPERTENSION: ICD-10-CM

## 2025-04-21 DIAGNOSIS — Z12.5 SCREENING FOR PROSTATE CANCER: ICD-10-CM

## 2025-04-21 DIAGNOSIS — E11.9 TYPE 2 DIABETES MELLITUS WITHOUT COMPLICATION, WITHOUT LONG-TERM CURRENT USE OF INSULIN (H): Primary | ICD-10-CM

## 2025-04-21 DIAGNOSIS — Z13.29 THYROID DISORDER SCREENING: ICD-10-CM

## 2025-04-21 NOTE — PROGRESS NOTES
Patient has upcoming lab appointment 5-15-25. Please place orders if needed.    Thank You,  Milagro

## 2025-05-15 ENCOUNTER — LAB (OUTPATIENT)
Dept: LAB | Facility: CLINIC | Age: 75
End: 2025-05-15
Payer: COMMERCIAL

## 2025-05-15 ENCOUNTER — RESULTS FOLLOW-UP (OUTPATIENT)
Dept: FAMILY MEDICINE | Facility: CLINIC | Age: 75
End: 2025-05-15

## 2025-05-15 DIAGNOSIS — Z12.5 SCREENING FOR PROSTATE CANCER: ICD-10-CM

## 2025-05-15 DIAGNOSIS — E78.00 PURE HYPERCHOLESTEROLEMIA: ICD-10-CM

## 2025-05-15 DIAGNOSIS — I10 PRIMARY HYPERTENSION: ICD-10-CM

## 2025-05-15 DIAGNOSIS — E11.9 TYPE 2 DIABETES MELLITUS WITHOUT COMPLICATION, WITHOUT LONG-TERM CURRENT USE OF INSULIN (H): ICD-10-CM

## 2025-05-15 DIAGNOSIS — Z13.29 THYROID DISORDER SCREENING: ICD-10-CM

## 2025-05-15 LAB
ANION GAP SERPL CALCULATED.3IONS-SCNC: 13 MMOL/L (ref 7–15)
BUN SERPL-MCNC: 27.1 MG/DL (ref 8–23)
CALCIUM SERPL-MCNC: 9.3 MG/DL (ref 8.8–10.4)
CHLORIDE SERPL-SCNC: 105 MMOL/L (ref 98–107)
CHOLEST SERPL-MCNC: 115 MG/DL
CREAT SERPL-MCNC: 1 MG/DL (ref 0.67–1.17)
EGFRCR SERPLBLD CKD-EPI 2021: 78 ML/MIN/1.73M2
ERYTHROCYTE [DISTWIDTH] IN BLOOD BY AUTOMATED COUNT: 12.2 % (ref 10–15)
EST. AVERAGE GLUCOSE BLD GHB EST-MCNC: 128 MG/DL
FASTING STATUS PATIENT QL REPORTED: ABNORMAL
FASTING STATUS PATIENT QL REPORTED: NORMAL
GLUCOSE SERPL-MCNC: 125 MG/DL (ref 70–99)
HBA1C MFR BLD: 6.1 % (ref 0–5.6)
HCO3 SERPL-SCNC: 23 MMOL/L (ref 22–29)
HCT VFR BLD AUTO: 42.3 % (ref 40–53)
HDLC SERPL-MCNC: 45 MG/DL
HGB BLD-MCNC: 14.8 G/DL (ref 13.3–17.7)
LDLC SERPL CALC-MCNC: 56 MG/DL
MCH RBC QN AUTO: 31 PG (ref 26.5–33)
MCHC RBC AUTO-ENTMCNC: 35 G/DL (ref 31.5–36.5)
MCV RBC AUTO: 89 FL (ref 78–100)
NONHDLC SERPL-MCNC: 70 MG/DL
PLATELET # BLD AUTO: 148 10E3/UL (ref 150–450)
POTASSIUM SERPL-SCNC: 4.5 MMOL/L (ref 3.4–5.3)
PSA SERPL DL<=0.01 NG/ML-MCNC: 5.79 NG/ML (ref 0–6.5)
RBC # BLD AUTO: 4.77 10E6/UL (ref 4.4–5.9)
SODIUM SERPL-SCNC: 141 MMOL/L (ref 135–145)
TRIGL SERPL-MCNC: 70 MG/DL
TSH SERPL DL<=0.005 MIU/L-ACNC: 1.62 UIU/ML (ref 0.3–4.2)
WBC # BLD AUTO: 5.7 10E3/UL (ref 4–11)

## 2025-05-15 PROCEDURE — G0103 PSA SCREENING: HCPCS | Performed by: FAMILY MEDICINE

## 2025-05-15 PROCEDURE — 80061 LIPID PANEL: CPT | Performed by: FAMILY MEDICINE

## 2025-05-15 PROCEDURE — 84443 ASSAY THYROID STIM HORMONE: CPT | Performed by: FAMILY MEDICINE

## 2025-05-15 PROCEDURE — 80048 BASIC METABOLIC PNL TOTAL CA: CPT | Performed by: FAMILY MEDICINE

## 2025-05-16 PROCEDURE — 82570 ASSAY OF URINE CREATININE: CPT | Performed by: FAMILY MEDICINE
